# Patient Record
Sex: MALE | Race: BLACK OR AFRICAN AMERICAN | Employment: UNEMPLOYED | URBAN - METROPOLITAN AREA
[De-identification: names, ages, dates, MRNs, and addresses within clinical notes are randomized per-mention and may not be internally consistent; named-entity substitution may affect disease eponyms.]

---

## 2017-11-03 ENCOUNTER — HOSPITAL ENCOUNTER (INPATIENT)
Age: 27
LOS: 3 days | Discharge: HOME | DRG: 880 | End: 2017-11-07
Attending: EMERGENCY MEDICINE | Admitting: PSYCHIATRY & NEUROLOGY
Payer: SELF-PAY

## 2017-11-03 DIAGNOSIS — F41.8 DEPRESSION WITH ANXIETY: ICD-10-CM

## 2017-11-03 DIAGNOSIS — F95.9 TIC: ICD-10-CM

## 2017-11-03 DIAGNOSIS — F41.1 ANXIETY STATE: Primary | ICD-10-CM

## 2017-11-03 LAB
ALBUMIN SERPL-MCNC: 4.1 G/DL (ref 3.5–5)
ALBUMIN/GLOB SERPL: 1.1 {RATIO} (ref 1.1–2.2)
ALP SERPL-CCNC: 48 U/L (ref 45–117)
ALT SERPL-CCNC: 21 U/L (ref 12–78)
AMPHET UR QL SCN: NEGATIVE
ANION GAP SERPL CALC-SCNC: 9 MMOL/L (ref 5–15)
APAP SERPL-MCNC: <2 UG/ML (ref 10–30)
APPEARANCE UR: CLEAR
AST SERPL-CCNC: 14 U/L (ref 15–37)
BACTERIA URNS QL MICRO: NEGATIVE /HPF
BARBITURATES UR QL SCN: NEGATIVE
BASOPHILS # BLD: 0 K/UL (ref 0–0.1)
BASOPHILS NFR BLD: 0 % (ref 0–1)
BENZODIAZ UR QL: NEGATIVE
BILIRUB SERPL-MCNC: 0.6 MG/DL (ref 0.2–1)
BILIRUB UR QL: NEGATIVE
BUN SERPL-MCNC: 12 MG/DL (ref 6–20)
BUN/CREAT SERPL: 10 (ref 12–20)
CALCIUM SERPL-MCNC: 8.8 MG/DL (ref 8.5–10.1)
CANNABINOIDS UR QL SCN: NEGATIVE
CHLORIDE SERPL-SCNC: 106 MMOL/L (ref 97–108)
CO2 SERPL-SCNC: 25 MMOL/L (ref 21–32)
COCAINE UR QL SCN: NEGATIVE
COLOR UR: ABNORMAL
CREAT SERPL-MCNC: 1.23 MG/DL (ref 0.7–1.3)
DRUG SCRN COMMENT,DRGCM: NORMAL
EOSINOPHIL # BLD: 0.1 K/UL (ref 0–0.4)
EOSINOPHIL NFR BLD: 1 % (ref 0–7)
EPITH CASTS URNS QL MICRO: ABNORMAL /LPF
ERYTHROCYTE [DISTWIDTH] IN BLOOD BY AUTOMATED COUNT: 12.5 % (ref 11.5–14.5)
ETHANOL SERPL-MCNC: <10 MG/DL
GLOBULIN SER CALC-MCNC: 3.7 G/DL (ref 2–4)
GLUCOSE SERPL-MCNC: 124 MG/DL (ref 65–100)
GLUCOSE UR STRIP.AUTO-MCNC: NEGATIVE MG/DL
HCT VFR BLD AUTO: 47.4 % (ref 36.6–50.3)
HGB BLD-MCNC: 16.3 G/DL (ref 12.1–17)
HGB UR QL STRIP: NEGATIVE
KETONES UR QL STRIP.AUTO: ABNORMAL MG/DL
LEUKOCYTE ESTERASE UR QL STRIP.AUTO: NEGATIVE
LYMPHOCYTES # BLD: 1.2 K/UL (ref 0.8–3.5)
LYMPHOCYTES NFR BLD: 17 % (ref 12–49)
MCH RBC QN AUTO: 30.6 PG (ref 26–34)
MCHC RBC AUTO-ENTMCNC: 34.4 G/DL (ref 30–36.5)
MCV RBC AUTO: 89.1 FL (ref 80–99)
METHADONE UR QL: NEGATIVE
MONOCYTES # BLD: 0.3 K/UL (ref 0–1)
MONOCYTES NFR BLD: 4 % (ref 5–13)
MUCOUS THREADS URNS QL MICRO: ABNORMAL /LPF
NEUTS SEG # BLD: 5.7 K/UL (ref 1.8–8)
NEUTS SEG NFR BLD: 78 % (ref 32–75)
NITRITE UR QL STRIP.AUTO: NEGATIVE
OPIATES UR QL: NEGATIVE
PCP UR QL: NEGATIVE
PH UR STRIP: 8 [PH] (ref 5–8)
PLATELET # BLD AUTO: 207 K/UL (ref 150–400)
POTASSIUM SERPL-SCNC: 3.6 MMOL/L (ref 3.5–5.1)
PROT SERPL-MCNC: 7.8 G/DL (ref 6.4–8.2)
PROT UR STRIP-MCNC: ABNORMAL MG/DL
RBC # BLD AUTO: 5.32 M/UL (ref 4.1–5.7)
RBC #/AREA URNS HPF: ABNORMAL /HPF (ref 0–5)
SALICYLATES SERPL-MCNC: <1.7 MG/DL (ref 2.8–20)
SODIUM SERPL-SCNC: 140 MMOL/L (ref 136–145)
SP GR UR REFRACTOMETRY: 1.03 (ref 1–1.03)
UA: UC IF INDICATED,UAUC: ABNORMAL
UROBILINOGEN UR QL STRIP.AUTO: 1 EU/DL (ref 0.2–1)
WBC # BLD AUTO: 7.3 K/UL (ref 4.1–11.1)
WBC URNS QL MICRO: ABNORMAL /HPF (ref 0–4)

## 2017-11-03 PROCEDURE — 80053 COMPREHEN METABOLIC PANEL: CPT | Performed by: STUDENT IN AN ORGANIZED HEALTH CARE EDUCATION/TRAINING PROGRAM

## 2017-11-03 PROCEDURE — 85025 COMPLETE CBC W/AUTO DIFF WBC: CPT | Performed by: STUDENT IN AN ORGANIZED HEALTH CARE EDUCATION/TRAINING PROGRAM

## 2017-11-03 PROCEDURE — 90791 PSYCH DIAGNOSTIC EVALUATION: CPT

## 2017-11-03 PROCEDURE — 80307 DRUG TEST PRSMV CHEM ANLYZR: CPT | Performed by: STUDENT IN AN ORGANIZED HEALTH CARE EDUCATION/TRAINING PROGRAM

## 2017-11-03 PROCEDURE — 81001 URINALYSIS AUTO W/SCOPE: CPT | Performed by: STUDENT IN AN ORGANIZED HEALTH CARE EDUCATION/TRAINING PROGRAM

## 2017-11-03 PROCEDURE — 36415 COLL VENOUS BLD VENIPUNCTURE: CPT | Performed by: STUDENT IN AN ORGANIZED HEALTH CARE EDUCATION/TRAINING PROGRAM

## 2017-11-03 PROCEDURE — 99284 EMERGENCY DEPT VISIT MOD MDM: CPT

## 2017-11-03 PROCEDURE — 80307 DRUG TEST PRSMV CHEM ANLYZR: CPT | Performed by: EMERGENCY MEDICINE

## 2017-11-03 NOTE — ED TRIAGE NOTES
Pt arrives with 2 friends from a support group with head twitching onset approximately 1630 today with nausea. Also, reports seeing shadows and hearing voices. After being questioned, patient reports suicidal ideations without a plan and \"feeling tired\" Denies CP, SOB, dizziness, or V/D. Denies drug or alcohol use.

## 2017-11-03 NOTE — IP AVS SNAPSHOT
2700 [facility] [address] [place] [address] 
[phone] Patient: Amber Stevenson MRN: [id number] SNM:[id number] About your hospitalization You were admitted on:  2017 You last received care in the:  [address] You were discharged on:  2017 Why you were hospitalized Your primary diagnosis was:  Depression With Anxiety Things You Need To Do (next 8 weeks) Follow up with None Where:  None (395) Patient stated that they have no PCP Schedule an appointment with Lluvia Carrillo as soon as possible for a visit today Make an appointment with Tristin Pabon. Phone:  [phone] Where:  [address] [place],[address] [facility] [address] Discharge Orders None A check panfilo indicates which time of day the medication should be taken. My Medications TAKE these medications as instructed Instructions Each Dose to Equal  
 Morning Noon Evening Bedtime  
 sertraline 25 mg tablet Commonly known as:  ZOLOFT Start taking on:  2017 Your next dose is:  Tomorrow at 9am  
   
 Take 1 Tab by mouth daily. Indications: ANXIETY WITH DEPRESSION  
 25 mg Where to Get Your Medications Information on where to get these meds will be given to you by the nurse or doctor. ! Ask your nurse or doctor about these medications  
  sertraline 25 mg tablet Discharge Instructions DISCHARGE SUMMARY 
 
Anne Fajardo : 1990 MRN: [id number] The patient Amber Stevenson exhibits the ability to control behavior in a less restrictive environment. Patient's level of functioning is improving. No assaultive/destructive behavior has been observed for the past 24 hours. No suicidal/homicidal threat or behavior has been observed for the past 24 hours.   There is no evidence of serious medication side effects. Patient has not been in physical or protective restraints for at least the past 24 hours. If weapons involved, how are they secured? No weapons involved. Is patient aware of and in agreement with discharge plan? Yes Arrangements for medication:  Prescriptions given to patient. Copy of discharge instructions to  provider?:  [facility] (Attn: Sommer Fox) Arrangements for transportation home:  Family to . Keep all follow up appointments as scheduled, continue to take prescribed medications per physician instructions. Mental health crisis number:  667 or your local mental health crisis line number at [phone]. DISCHARGE SUMMARY from Nurse PATIENT INSTRUCTIONS: 
 
What to do at Home: 
Recommended activity: Activity as tolerated, If you experience any of the following symptoms thoughts of harming self, feeling overwhelmed with anxiety and sadness, please follow up with your assigned providers and local crisis number. *  Please give a list of your current medications to your Primary Care Provider. *  Please update this list whenever your medications are discontinued, doses are 
    changed, or new medications (including over-the-counter products) are added. *  Please carry medication information at all times in case of emergency situations. These are general instructions for a healthy lifestyle: No smoking/ No tobacco products/ Avoid exposure to second hand smoke Surgeon General's Warning:  Quitting smoking now greatly reduces serious risk to your health. Obesity, smoking, and sedentary lifestyle greatly increases your risk for illness A healthy diet, regular physical exercise & weight monitoring are important for maintaining a healthy lifestyle You may be retaining fluid if you have a history of heart failure or if you experience any of the following symptoms:  Weight gain of 3 pounds or more overnight or 5 pounds in a week, increased swelling in our hands or feet or shortness of breath while lying flat in bed. Please call your doctor as soon as you notice any of these symptoms; do not wait until your next office visit. Recognize signs and symptoms of STROKE: 
 
F-face looks uneven A-arms unable to move or move unevenly S-speech slurred or non-existent T-time-call 911 as soon as signs and symptoms begin-DO NOT go Back to bed or wait to see if you get better-TIME IS BRAIN. Warning Signs of HEART ATTACK Call 911 if you have these symptoms: 
? Chest discomfort. Most heart attacks involve discomfort in the center of the chest that lasts more than a few minutes, or that goes away and comes back. It can feel like uncomfortable pressure, squeezing, fullness, or pain. ? Discomfort in other areas of the upper body. Symptoms can include pain or discomfort in one or both arms, the back, neck, jaw, or stomach. ? Shortness of breath with or without chest discomfort. ? Other signs may include breaking out in a cold sweat, nausea, or lightheadedness. Don't wait more than five minutes to call [address]! Fast action can save your life. Calling 911 is almost always the fastest way to get lifesaving treatment. Emergency Medical Services staff can begin treatment when they arrive  up to an hour sooner than if someone gets to the hospital by car. The discharge information has been reviewed with the patient. The patient verbalized understanding. Discharge medications reviewed with the patient and appropriate educational materials and side effects teaching were provided. ___________________________________________________________________________________________________________________________________ Introducing [address]!    
 Chapincito Cabrera introduces [employer] patient portal. Now you can access parts of your medical record, email your doctor's office, and request medication refills online. 1. In your internet browser, go to [email or web address] 2. Click on the First Time User? Click Here link in the Sign In box. You will see the New Member Sign Up page. 3. Enter your [employer] Access Code exactly as it appears below. You will not need to use this code after youve completed the sign-up process. If you do not sign up before the expiration date, you must request a new code. · [employer] Access Code: [email or web address] Expires: 2/1/2018 11:32 PM 
 
4. Enter the last four digits of your Social Security Number (xxxx) and Date of Birth (mm/dd/yyyy) as indicated and click Submit. You will be taken to the next sign-up page. 5. Create a [employer] ID. This will be your [employer] login ID and cannot be changed, so think of one that is secure and easy to remember. 6. Create a [employer] password. You can change your password at any time. 7. Enter your Password Reset Question and Answer. This can be used at a later time if you forget your password. 8. Enter your e-mail address. You will receive e-mail notification when new information is available in [address] 9. Click Sign Up. You can now view and download portions of your medical record. 10. Click the Download Summary menu link to download a portable copy of your medical information. If you have questions, please visit the Frequently Asked Questions section of the [employer] website. Remember, [employer] is NOT to be used for urgent needs. For medical emergencies, dial 911. Now available from your iPhone and Android! Providers Seen During Your Hospitalization Provider Specialty Primary office phone Hallie Doyle MD Emergency Medicine [phone] Marcie Alvarez MD Psychiatry [phone] Your Primary Care Physician (PCP) Primary Care Physician Office Phone Office Fax  NONE ** None ** ** None **  
  
 You are allergic to the following Allergen Reactions Seafood Hives Recent Documentation Height Weight BMI Smoking Status 1.676 m 55.2 kg 19.64 kg/m2 Current Some Day Smoker Emergency Contacts Name Discharge Info Relation Home Work Mobile Shadi Ludwig DISCHARGE CAREGIVER [3] Other Relative [[phone] Patient Belongings The following personal items are in your possession at time of discharge: 
  Dental Appliances: None  Visual Aid: None      Home Medications: None   Jewelry: None  Clothing: Pants, Shirt, Socks, Undergarments    Other Valuables: Keys, Money (comment) (6 dollars in cash 103 in change) Please provide this summary of care documentation to your next provider. Signatures-by signing, you are acknowledging that this After Visit Summary has been reviewed with you and you have received a copy. Patient Signature:  ____________________________________________________________ Date:  ____________________________________________________________  
  
Roxi Best Provider Signature:  ____________________________________________________________ Date:  ____________________________________________________________

## 2017-11-03 NOTE — IP AVS SNAPSHOT
Summary of Care Report The Summary of Care report has been created to help improve care coordination. Users with access to [employer] or [address] (Web-based application) may access additional patient information including the Discharge Summary. If you are not currently a [address] user and need more information, please call the number listed below in the [place] [address] section and ask to be connected with Medical Records. Facility Information Name Address Phone Gwendolyn Torres [address] [place] [address] [id number] [phone] Patient Information Patient Name Sex  Clotilde Kolb ([id number]) Male 1990 Discharge Information Admitting Provider Service Area Unit Antonio Laughlin MD / Lisbeth Rivera 238 / [phone] Discharge Provider Discharge Date/Time Discharge Disposition Destination (none) 2017 Afternoon (Pending) AHR (none) Patient Language Language ENGLISH [13] Hospital Problems as of 2017  Never Reviewed Class Noted - Resolved Last Modified POA Active Problems * (Principal)Depression with anxiety  2017 - Present 2017 by Leticia Chavarria MD Yes Entered by Antonio Laughlin MD  
  
You are allergic to the following Allergen Reactions Seafood Hives Current Discharge Medication List  
  
START taking these medications Dose & Instructions Dispensing Information Comments  
 sertraline 25 mg tablet Commonly known as:  ZOLOFT Start taking on:  2017 Dose:  25 mg Take 1 Tab by mouth daily. Indications: ANXIETY WITH DEPRESSION Quantity:  15 Tab Refills:  1 Follow-up Information Follow up With Details Comments Contact Info  None   None (395) Patient stated that they have no PCP 
  
 [facility] Schedule an appointment as soon as possible for a visit Make an appointment with Mode Tena. [address] 
[phone] Discharge Instructions DISCHARGE SUMMARY 
 
Xavier White : 1990 MRN: [id number] The patient Ameya Blas exhibits the ability to control behavior in a less restrictive environment. Patient's level of functioning is improving. No assaultive/destructive behavior has been observed for the past 24 hours. No suicidal/homicidal threat or behavior has been observed for the past 24 hours. There is no evidence of serious medication side effects. Patient has not been in physical or protective restraints for at least the past 24 hours. If weapons involved, how are they secured? No weapons involved. Is patient aware of and in agreement with discharge plan? Yes Arrangements for medication:  Prescriptions given to patient. Copy of discharge instructions to  provider?:  [facility] (Attn: Mode Tena) Arrangements for transportation home:  Family to . Keep all follow up appointments as scheduled, continue to take prescribed medications per physician instructions. Mental health crisis number:  [phone] or your local mental health crisis line number at [phone]. DISCHARGE SUMMARY from Nurse PATIENT INSTRUCTIONS: 
 
What to do at Home: 
Recommended activity: Activity as tolerated, If you experience any of the following symptoms thoughts of harming self, feeling overwhelmed with anxiety and sadness, please follow up with your assigned providers and local crisis number. *  Please give a list of your current medications to your Primary Care Provider. *  Please update this list whenever your medications are discontinued, doses are 
    changed, or new medications (including over-the-counter products) are added.  
 
*  Please carry medication information at all times in case of emergency situations. These are general instructions for a healthy lifestyle: No smoking/ No tobacco products/ Avoid exposure to second hand smoke Surgeon General's Warning:  Quitting smoking now greatly reduces serious risk to your health. Obesity, smoking, and sedentary lifestyle greatly increases your risk for illness A healthy diet, regular physical exercise & weight monitoring are important for maintaining a healthy lifestyle You may be retaining fluid if you have a history of heart failure or if you experience any of the following symptoms:  Weight gain of 3 pounds or more overnight or 5 pounds in a week, increased swelling in our hands or feet or shortness of breath while lying flat in bed. Please call your doctor as soon as you notice any of these symptoms; do not wait until your next office visit. Recognize signs and symptoms of STROKE: 
 
F-face looks uneven A-arms unable to move or move unevenly S-speech slurred or non-existent T-time-call 911 as soon as signs and symptoms begin-DO NOT go Back to bed or wait to see if you get better-TIME IS BRAIN. Warning Signs of HEART ATTACK Call 911 if you have these symptoms: 
? Chest discomfort. Most heart attacks involve discomfort in the center of the chest that lasts more than a few minutes, or that goes away and comes back. It can feel like uncomfortable pressure, squeezing, fullness, or pain. ? Discomfort in other areas of the upper body. Symptoms can include pain or discomfort in one or both arms, the back, neck, jaw, or stomach. ? Shortness of breath with or without chest discomfort. ? Other signs may include breaking out in a cold sweat, nausea, or lightheadedness. Don't wait more than five minutes to call [address]! Fast action can save your life. Calling 911 is almost always the fastest way to get lifesaving treatment.  Emergency Medical Services staff can begin treatment when they arrive  up to an hour sooner than if someone gets to the hospital by car. The discharge information has been reviewed with the patient. The patient verbalized understanding. Discharge medications reviewed with the patient and appropriate educational materials and side effects teaching were provided. ___________________________________________________________________________________________________________________________________ Chart Review Routing History No Routing History on File

## 2017-11-03 NOTE — IP AVS SNAPSHOT
[address] 
[phone] Patient: Juana Beal MRN: [id number] AOY:[id number]/5/6351 My Medications TAKE these medications as instructed Instructions Each Dose to Equal  
 Morning Noon Evening Bedtime  
 sertraline 25 mg tablet Commonly known as:  ZOLOFT Start taking on:  11/8/2017 Your next dose is:  Tomorrow at 9am  
   
 Take 1 Tab by mouth daily. Indications: ANXIETY WITH DEPRESSION  
 25 mg Where to Get Your Medications Information on where to get these meds will be given to you by the nurse or doctor. ! Ask your nurse or doctor about these medications  
  sertraline 25 mg tablet

## 2017-11-04 PROBLEM — F41.8 DEPRESSION WITH ANXIETY: Status: ACTIVE | Noted: 2017-11-04

## 2017-11-04 PROCEDURE — 65220000003 HC RM SEMIPRIVATE PSYCH

## 2017-11-04 PROCEDURE — 74011250637 HC RX REV CODE- 250/637: Performed by: PSYCHIATRY & NEUROLOGY

## 2017-11-04 RX ORDER — SERTRALINE HYDROCHLORIDE 50 MG/1
25 TABLET, FILM COATED ORAL DAILY
Status: DISCONTINUED | OUTPATIENT
Start: 2017-11-04 | End: 2017-11-07 | Stop reason: HOSPADM

## 2017-11-04 RX ORDER — OLANZAPINE 5 MG/1
5 TABLET ORAL
Status: DISCONTINUED | OUTPATIENT
Start: 2017-11-04 | End: 2017-11-05

## 2017-11-04 RX ORDER — ACETAMINOPHEN 500 MG
2 TABLET ORAL
Status: DISCONTINUED | OUTPATIENT
Start: 2017-11-04 | End: 2017-11-07 | Stop reason: HOSPADM

## 2017-11-04 RX ORDER — ZOLPIDEM TARTRATE 10 MG/1
10 TABLET ORAL
Status: DISCONTINUED | OUTPATIENT
Start: 2017-11-04 | End: 2017-11-07 | Stop reason: HOSPADM

## 2017-11-04 RX ORDER — BENZTROPINE MESYLATE 2 MG/1
2 TABLET ORAL
Status: DISCONTINUED | OUTPATIENT
Start: 2017-11-04 | End: 2017-11-07 | Stop reason: HOSPADM

## 2017-11-04 RX ORDER — LORAZEPAM 1 MG/1
1 TABLET ORAL
Status: DISCONTINUED | OUTPATIENT
Start: 2017-11-04 | End: 2017-11-07 | Stop reason: HOSPADM

## 2017-11-04 RX ORDER — ADHESIVE BANDAGE
30 BANDAGE TOPICAL DAILY PRN
Status: DISCONTINUED | OUTPATIENT
Start: 2017-11-04 | End: 2017-11-07 | Stop reason: HOSPADM

## 2017-11-04 RX ORDER — IBUPROFEN 400 MG/1
400 TABLET ORAL
Status: DISCONTINUED | OUTPATIENT
Start: 2017-11-04 | End: 2017-11-07 | Stop reason: HOSPADM

## 2017-11-04 RX ORDER — BENZTROPINE MESYLATE 1 MG/ML
2 INJECTION INTRAMUSCULAR; INTRAVENOUS
Status: DISCONTINUED | OUTPATIENT
Start: 2017-11-04 | End: 2017-11-07 | Stop reason: HOSPADM

## 2017-11-04 RX ORDER — IBUPROFEN 200 MG
1 TABLET ORAL
Status: DISCONTINUED | OUTPATIENT
Start: 2017-11-04 | End: 2017-11-07 | Stop reason: HOSPADM

## 2017-11-04 RX ORDER — LORAZEPAM 2 MG/ML
2 INJECTION INTRAMUSCULAR
Status: DISCONTINUED | OUTPATIENT
Start: 2017-11-04 | End: 2017-11-07 | Stop reason: HOSPADM

## 2017-11-04 RX ORDER — ACETAMINOPHEN 325 MG/1
650 TABLET ORAL
Status: DISCONTINUED | OUTPATIENT
Start: 2017-11-04 | End: 2017-11-07 | Stop reason: HOSPADM

## 2017-11-04 RX ADMIN — NICOTINE POLACRILEX 2 MG: 2 GUM, CHEWING ORAL at 10:50

## 2017-11-04 RX ADMIN — SERTRALINE HYDROCHLORIDE 25 MG: 50 TABLET ORAL at 12:05

## 2017-11-04 RX ADMIN — OLANZAPINE 5 MG: 5 TABLET, FILM COATED ORAL at 02:09

## 2017-11-04 RX ADMIN — LORAZEPAM 1 MG: 1 TABLET ORAL at 02:09

## 2017-11-04 NOTE — ED NOTES
Bedside shift change report given to Emigdio bauer RN (oncoming nurse) by Anthony Vallejo RN (offgoing nurse). Report included the following information SBAR, ED Summary, MAR and Recent Results.

## 2017-11-04 NOTE — BH NOTES
TRANSFER - IN REPORT:    Verbal report received from Deonte on Hildred Balling  being received from [facility] ER for routine progression of care      Report consisted of patients Situation, Background, Assessment and   Recommendations(SBAR). Information from the following report(s) SBAR, Kardex, ED Summary, MAR, Recent Results and Med Rec Status was reviewed with the receiving nurse. Opportunity for questions and clarification was provided. Assessment completed upon patients arrival to unit and care assumed.

## 2017-11-04 NOTE — ROUTINE PROCESS
TRANSFER - OUT REPORT:    Verbal report given to Poppy Harp Rn(name) on [facility]  being transferred to (unit) for routine progression of care       Report consisted of patients Situation, Background, Assessment and   Recommendations(SBAR). Information from the following report(s) SBAR, Kardex, ED Summary and MAR was reviewed with the receiving nurse. Lines:       Opportunity for questions and clarification was provided.       Patient transported with:   Tech   officer

## 2017-11-04 NOTE — PROGRESS NOTES
Problem: Altered Thought Process (Adult/Pediatric)  Goal: *STG: Remains safe in hospital  Outcome: Progressing Towards Goal  1530: Greeted patient on unit in room resting quietly. Appeared in no acute distress. Will continue to monitor on Q 15 minute safety checks. 2015: Patient alert. Vitals stable, wnl.   Medication compliant. Attended group. Cooperative with staff and peers.

## 2017-11-04 NOTE — BH NOTES
Behavioral Health Admission Note:    Patient admitted under Dr. Mitchell Baires for Anxiety D/O    Admission status: Voluntary    Chief complaint: \"I've had a lot of stress and anxiety lately\" Pt presented to [facility] ER with c/o anxiety and newly developed tic that he states is related to the anxiety. Pt denies si/HI at this time and contracts for safety. Pt states he is experiencing AH/VH but not of the command type. Will monitor via 15 minute observations and support. PTA MED VERIFICATION    PATIENT'S PHARMACY IS [facility] ON [address]  THE PHARMACY WAS CLOSED HOWEVER PATIENT IS NOT CURRENTLY ON MEDICATIONS  THEREFORE PTA MEDS WERE VERIFIED AND RECORDED ON PTA MED LIST    Primary Nurse Carole Nichols RN and Kinjal Pino. performed a dual skin assessment on this patient : pt presents with cracked skin on bottom of left foot. CDI no drainage noted from area. Otherwise skin assessment was unremarkable. Jewelry on patient: two awareness support bracelets which pt elected to keep  Patient behavior: Anxious, cooperative  Safety: Q15 minute observations per protocol      Pressure Ulcer Documentation  (COMPLETE ONE LABEL PER PRESSURE ULCER)  For further information, please review corresponding Wound Care flowsheet. Thomas So has:    No pressure ulcer noted and pressure ulcer prevention initiated. Nara Craft.  OLIVER

## 2017-11-04 NOTE — BH NOTES
GROUP THERAPY PROGRESS NOTE    Fernando Glasgow is participating in D/C Planning Group    Group time: 1 hour    Personal goal for participation:  Creating Wellness ( Video )     Goal orientation: personal    Group therapy participation: active and minimal    Therapeutic interventions reviewed and discussed:     Impression of participation: Pt was new to the group but her made significant contributions with his comments as it related to the concept of Wellness

## 2017-11-04 NOTE — BSMART NOTE
Comprehensive Assessment Form Part 1      Section I - Disposition    Axis I -  Anxiety Disorder                Depressive Disorder NOS  Axis II - Deferred  Axis III -   Past Medical History Date Comments     Migraine [G43.909]    Asthma (h/x reported)    Axis IV - Unemployed, recently had court involvement was released  [place] V - 54      The Medical Doctor to Psychiatrist conference was not completed. The Medical Doctor is in agreement with Psychiatrist disposition because of (reason) patient meets criteria for admission and is a voluntary admission. The plan is admit. [facility] 1 Unit  The on-call Psychiatrist consulted was Dr. Brigette Boucher. The admitting Psychiatrist will be Dr. Brigette Boucher. The admitting Diagnosis is Anxiety Disorder/ Depressive Disorder NOS. The Payor source is [employer]. The name of the representative was . This was approved for  days. The authorization number is . Section II - Integrated Summary  Summary:  Patient is a 24year old AA male reporting to ED with 2 friends from a support group with head twitching onset approximately 1630 today with nausea. Also, reports seeing shadows and hearing voices. After being questioned, patient reports suicidal ideations without a plan and \"feeling tired\" Denies CP, SOB, dizziness, or V/D. Denies drug or alcohol use. At bedside, patient was observed twitching throughout the assessment, fidgeting with is phone, unfocused, inconsistent eye contact. Patient denied being suicidal at the time of assessment, denied homicidal thoughts. Patient reported having hallucinations as reported shadows, and hearing names being called. Patient reported he has been having thoughts of harming himself without a plan. Patient reported having increased anxiety, depressed and stressed. Patient identified stressors as socioeconomic stressors (unemployed) and also stated he really does not know.  Patient reported that he has had twitching today and it has increased. As reported by his uncle after going through court system for lengthy period and things were released due to court staying he would not be able to follow through with proceedings. Patient has been having consistent twitching since court. Patient has not been eating and poor sleeping habits. Patient reported maintaining his personal care but uncle reported there has been a decline. As reported by uncle the client has had increased difficulty focusing  And constantly drifting while doing things and during conversations. Patient has appointment for November 27, 2017 with [facility]. Patient hasnot been previously hospitalized and was on medications earlier this year from [facility] as reported medication not identified. The patienthas demonstrated mental capacity to provide informed consent. The information is given by the patient and relative(s). The Chief Complaint is anxiety, hallucinations, depression. The Precipitant Factors are mental health, unemployed. Previous Hospitalizations: no  The patient has not previously been in restraints. Current Psychiatrist and/or  is none. Lethality Assessment:    The potential for suicide noted by the following: ideation not reported during assessment . The potential for homicide is not noted. The patient has not been a perpetrator of sexual or physical abuse. There are not pending charges. The patient is not felt to be at risk for self harm or harm to others. The attending nurse was advised not noted. Section III - Psychosocial  The patient's overall mood and attitude is anxious, depressed, cooperative. Feelings of helplessness and hopelessness are not observed. Generalized anxiety is not observed. Panic is not observed. Phobias are not observed. Obsessive compulsive tendencies are not observed. Section IV - Mental Status Exam  The patient's appearance is unkempt.   The patient's behavior displays tremors and shows poor eye contact. The patient is oriented to time, place, person and situation. The patient's speech shows no evidence of impairment. The patient's mood is depressed and is anxious. The range of affect is constricted. The patient's thought content demonstrates no evidence of impairment. The thought process shows no evidence of impairment. The patient's perception demonstrated changes in the following:  hallucinations. The patient's memory shows no evidence of impairment. The patient's appetite shows no evidence of impairment. The patient's sleep has evidence of insomnia. The patient's insight shows no evidence of impairment. The patient's judgement shows no evidence of impairment. Section V - Substance Abuse  The patient is using substances. The patient is using tobacco by inhalation for 5-10 years with last use on yesterday. The patient has experienced the following withdrawal symptoms: N/A. Section VI - Living Arrangements  The patient is single. The patient lives grandmother. The patient has no children. The patient does plan to return home upon discharge. The patient does not have legal issues pending. The patient's source of income comes from family. Denominational and cultural practices have not been voiced at this time. The patient's greatest support comes from family and this person will be involved with the treatment. The patient has not been in an event described as horrible or outside the realm of ordinary life experience either currently or in the past.  The patient has not been a victim of sexual/physical abuse. Section VII - Other Areas of Clinical Concern  The highest grade achieved is 11th with the overall quality of school experience being described as unknown. The patient is currently unemployed and speaks [place] as a primary language. The patient has no communication impairments affecting communication.  The patient's preference for learning can be described as: can read and write adequately.   The patient's hearing is normal.  The patient's vision is normal.      Sade Garcia

## 2017-11-04 NOTE — ED NOTES
Patient sitting quietly in chair. Denies needs or concerns at this time. Family at bedside. Will continue to monitor.

## 2017-11-04 NOTE — INTERDISCIPLINARY ROUNDS
Behavioral Health Interdisciplinary Rounds     Patient Name: Ameya Blas  Age: 24 y.o. Room/Bed:  731/  Primary Diagnosis: <principal problem not specified>   Admission Status: Voluntary     Readmission within 30 days: no  Power of  in place: no  Patient requires a blocked bed: no          Reason for blocked bed: n/a    VTE Prophylaxis: Not indicated  Flu vaccine given : no -refused  Mobility needs/Fall risk: no    Nutritional Plan: no  Consults: no         Labs/Testing due today?: no    Sleep hours: 2:45       Participation in Care/Groups:  NEW ADMIT  Medication Compliant?: Yes  PRNS (last 24 hours): Antipsychotic (PO) and Antianxiety    Restraints (last 24 hours):  no  Substance Abuse:  no  CIWA (range last 24 hours):  COWS (range last 24 hours):  Alcohol screening (AUDIT) completed -  AUDIT Score: 0  If applicable, date SBIRT discussed in treatment team AND documented: n/a  Tobacco - patient is a smoker: yes   Date tobacco education completed by RN: 11/4/17  24 hour chart check complete: yes     Patient goal(s) for today: Meet with Tx team to discuss course of tx and goals  Treatment team focus/goals: Complete Psych Social Assessment  Progress note : Alert, cooperative,anxious and difficulty expressing his thoughts. LOS:  0  Expected LOS:    Financial concerns/prescription coverage:  None  Date of last family contact:      Family requesting physician contact today:    Discharge plan: Return home to live with his grandmother  Guns in the home:  No      Outpatient provider(s): [facility] on 11/27/2017     Participating treatment team members: Marcellarenu Wan, Dr Benji Pro, lAex Quesada RN and Vanesa JACKSON

## 2017-11-04 NOTE — BH NOTES
0209 PRN Medication Documentation    Specific patient behavior that led to need for PRN medication: c/o anxiety as ah/vh  Staff interventions attempted prior to PRN being given: relaxation techniques discussed however pt arrived to unit anxious.   PRN medication given: ativan 1mg po, Zyprexa 5 mg po  Patient response/effectiveness of PRN medication: continue to monitor

## 2017-11-04 NOTE — H&P
INITIAL PSYCHIATRIC EVALUATION            IDENTIFICATION:    Patient Name  Yamini Aldridge   Date of Birth 1990   [facility] [id number]   Medical Record Number  [id number]      Age  24 y.o. PCP None   Admit date:  11/3/2017    Room Number  731/01  @ [facility]   Date of Service  11/4/2017            HISTORY         REASON FOR HOSPITALIZATION:  CC: Pt admitted under a voluntary basis for severe depression with suicidal ideations and  psychosis proving to be an imminent danger to self and and an inability to care for self. HISTORY OF PRESENT ILLNESS:    The patient, Yamini Aldridge, is a 24 y.o. BLACK OR  male with a past psychiatric history significant for depression and anxiety , who presents at this time with complaints of (and/or evidence of) the following emotional symptoms: depression, suicidal thoughts/threats, agitation and anxiety. Additional symptomatology include anxiety, depression worse, feeling depressed, feeling suicidal and financial problems. The above symptoms have been present for years . These symptoms are of moderate  severity. These symptoms are constant in nature. The patient's condition has been precipitated by financial problems and psychosocial stressors (problems with family and legal problems  ). He stated he hears voices and hears people call his name and interrupt him focusing and distract him and saying negative things , he is not feeling well today and he is anxious      ALLERGIES:   Allergies   Allergen Reactions    Seafood Hives      MEDICATIONS PRIOR TO ADMISSION:   No prescriptions prior to admission. PAST MEDICAL HISTORY:   Past Medical History:   Diagnosis Date    Migraine    History reviewed. No pertinent surgical history.    SOCIAL HISTORY: single and lives with grandmother    Social History     Social History    Marital status: SINGLE     Spouse name: N/A    Number of children: N/A    Years of education: N/A     Occupational History    Not on file. Social History Main Topics    Smoking status: Current Some Day Smoker    Smokeless tobacco: Never Used    Alcohol use No    Drug use: No    Sexual activity: Not on file     Other Topics Concern    Not on file     Social History Narrative      FAMILY HISTORY: History reviewed. No pertinent family history. History reviewed. No pertinent family history. REVIEW OF SYSTEMS:   History obtained from the patient  Pertinent items are noted in the History of Present Illness. All other Systems reviewed and are considered negative. MENTAL STATUS EXAM & VITALS     MENTAL STATUS EXAM (MSE):    MSE FINDINGS ARE WITHIN NORMAL LIMITS (WNL) UNLESS OTHERWISE STATED BELOW. ( ALL OF THE BELOW CATEGORIES OF THE MSE HAVE BEEN REVIEWED (reviewed 11/4/2017) AND UPDATED AS DEEMED APPROPRIATE )  General Presentation age appropriate, guarded   Orientation oriented to time, place and person   Vital Signs  See below (reviewed 11/4/2017); Vital Signs (BP, Pulse, & Temp) are within normal limits if not listed below.    Gait and Station Stable/steady, no ataxia   Musculoskeletal System No extrapyramidal symptoms (EPS); no abnormal muscular movements or Tardive Dyskinesia (TD); muscle strength and tone are within normal limits   Language No aphasia or dysarthria   Speech:  normal pitch and normal volume   Thought Processes logical; normal rate of thoughts; poor abstract reasoning/computation   Thought Associations goal directed   Thought Content free of delusions   Suicidal Ideations no plan    Homicidal Ideations no plan    Mood:  anxious    Affect:  anxious   Memory recent  fair   Memory remote:  fair   Concentration/Attention:  fair   Fund of Knowledge average   Insight:  fair   Reliability poor   Judgment:  fair          VITALS:     Patient Vitals for the past 24 hrs:   Temp Pulse Resp BP SpO2   11/04/17 0800 98.2 °F (36.8 °C) 61 17 134/71 94 %   11/04/17 0154 97.8 °F (36.6 °C) 76 17 121/82 100 %   11/04/17 0129 99.1 °F (37.3 °C) 82 16 121/74 100 %   11/03/17 2320 98.6 °F (37 °C) 83 16 131/83 99 %   11/03/17 2115 98.8 °F (37.1 °C) 89 18 101/66 100 %   11/03/17 1845 98.8 °F (37.1 °C) (!) 114 22 133/78 100 %     Wt Readings from Last 3 Encounters:   11/03/17 57.2 kg (126 lb)   02/07/15 61.2 kg (135 lb)     Temp Readings from Last 3 Encounters:   11/04/17 98.2 °F (36.8 °C)   02/07/15 98.1 °F (36.7 °C)     BP Readings from Last 3 Encounters:   11/04/17 134/71   02/07/15 119/75     Pulse Readings from Last 3 Encounters:   11/04/17 61   02/07/15 72            DATA     LABORATORY DATA:  Labs Reviewed   CBC WITH AUTOMATED DIFF - Abnormal; Notable for the following:        Result Value    NEUTROPHILS 78 (*)     MONOCYTES 4 (*)     All other components within normal limits   METABOLIC PANEL, COMPREHENSIVE - Abnormal; Notable for the following:     Glucose 124 (*)     BUN/Creatinine ratio 10 (*)     AST (SGOT) 14 (*)     All other components within normal limits   URINALYSIS W/ REFLEX CULTURE - Abnormal; Notable for the following:     Protein TRACE (*)     Ketone TRACE (*)     Mucus 1+ (*)     All other components within normal limits   ACETAMINOPHEN - Abnormal; Notable for the following:     Acetaminophen level <2 (*)     All other components within normal limits   SALICYLATE - Abnormal; Notable for the following:     Salicylate level <4.0 (*)     All other components within normal limits   SAMPLES BEING HELD   DRUG SCREEN, URINE   ETHYL ALCOHOL     Admission on 11/03/2017   Component Date Value Ref Range Status    WBC 11/03/2017 7.3  4.1 - 11.1 K/uL Final    RBC 11/03/2017 5.32  4.10 - 5.70 M/uL Final    HGB 11/03/2017 16.3  12.1 - 17.0 g/dL Final    HCT 11/03/2017 47.4  36.6 - 50.3 % Final    MCV 11/03/2017 89.1  80.0 - 99.0 FL Final    MCH 11/03/2017 30.6  26.0 - 34.0 PG Final    MCHC 11/03/2017 34.4  30.0 - 36.5 g/dL Final    RDW 11/03/2017 12.5  11.5 - 14.5 % Final    PLATELET 90/18/2660 455  150 - 400 K/uL Final    NEUTROPHILS 11/03/2017 78* 32 - 75 % Final    LYMPHOCYTES 11/03/2017 17  12 - 49 % Final    MONOCYTES 11/03/2017 4* 5 - 13 % Final    EOSINOPHILS 11/03/2017 1  0 - 7 % Final    BASOPHILS 11/03/2017 0  0 - 1 % Final    ABS. NEUTROPHILS 11/03/2017 5.7  1.8 - 8.0 K/UL Final    ABS. LYMPHOCYTES 11/03/2017 1.2  0.8 - 3.5 K/UL Final    ABS. MONOCYTES 11/03/2017 0.3  0.0 - 1.0 K/UL Final    ABS. EOSINOPHILS 11/03/2017 0.1  0.0 - 0.4 K/UL Final    ABS. BASOPHILS 11/03/2017 0.0  0.0 - 0.1 K/UL Final    Sodium 11/03/2017 140  136 - 145 mmol/L Final    Potassium 11/03/2017 3.6  3.5 - 5.1 mmol/L Final    Chloride 11/03/2017 106  97 - 108 mmol/L Final    CO2 11/03/2017 25  21 - 32 mmol/L Final    Anion gap 11/03/2017 9  5 - 15 mmol/L Final    Glucose 11/03/2017 124* 65 - 100 mg/dL Final    BUN 11/03/2017 12  6 - 20 MG/DL Final    Creatinine 11/03/2017 1.23  0.70 - 1.30 MG/DL Final    BUN/Creatinine ratio 11/03/2017 10* 12 - 20   Final    GFR est AA 11/03/2017 >60  >60 ml/min/1.73m2 Final    GFR est non-AA 11/03/2017 >60  >60 ml/min/1.73m2 Final    Calcium 11/03/2017 8.8  8.5 - 10.1 MG/DL Final    Bilirubin, total 11/03/2017 0.6  0.2 - 1.0 MG/DL Final    ALT (SGPT) 11/03/2017 21  12 - 78 U/L Final    AST (SGOT) 11/03/2017 14* 15 - 37 U/L Final    Alk.  phosphatase 11/03/2017 48  45 - 117 U/L Final    Protein, total 11/03/2017 7.8  6.4 - 8.2 g/dL Final    Albumin 11/03/2017 4.1  3.5 - 5.0 g/dL Final    Globulin 11/03/2017 3.7  2.0 - 4.0 g/dL Final    A-G Ratio 11/03/2017 1.1  1.1 - 2.2   Final    Color 11/03/2017 YELLOW/STRAW    Final    Appearance 11/03/2017 CLEAR  CLEAR   Final    Specific gravity 11/03/2017 1.030  1.003 - 1.030   Final    pH (UA) 11/03/2017 8.0  5.0 - 8.0   Final    Protein 11/03/2017 TRACE* NEG mg/dL Final    Glucose 11/03/2017 NEGATIVE   NEG mg/dL Final    Ketone 11/03/2017 TRACE* NEG mg/dL Final    Bilirubin 11/03/2017 NEGATIVE   NEG   Final    Blood 11/03/2017 NEGATIVE NEG   Final    Urobilinogen 11/03/2017 1.0  0.2 - 1.0 EU/dL Final    Nitrites 11/03/2017 NEGATIVE   NEG   Final    Leukocyte Esterase 11/03/2017 NEGATIVE   NEG   Final    WBC 11/03/2017 0-4  0 - 4 /hpf Final    RBC 11/03/2017 0-5  0 - 5 /hpf Final    Epithelial cells 11/03/2017 FEW  FEW /lpf Final    Bacteria 11/03/2017 NEGATIVE   NEG /hpf Final    UA:UC IF INDICATED 11/03/2017 CULTURE NOT INDICATED BY UA RESULT  CNI   Final    Mucus 11/03/2017 1+* NEG /lpf Final    AMPHETAMINES 11/03/2017 NEGATIVE   NEG   Final    BARBITURATES 11/03/2017 NEGATIVE   NEG   Final    BENZODIAZEPINES 11/03/2017 NEGATIVE   NEG   Final    COCAINE 11/03/2017 NEGATIVE   NEG   Final    METHADONE 11/03/2017 NEGATIVE   NEG   Final    OPIATES 11/03/2017 NEGATIVE   NEG   Final    PCP(PHENCYCLIDINE) 11/03/2017 NEGATIVE   NEG   Final    THC (TH-CANNABINOL) 11/03/2017 NEGATIVE   NEG   Final    Drug screen comment 11/03/2017 (NOTE)   Final    ALCOHOL(ETHYL),SERUM 11/03/2017 <10  <10 MG/DL Final    Acetaminophen level 11/03/2017 <2* 10 - 30 ug/mL Final    Salicylate level 69/05/5961 <1.7* 2.8 - 20.0 MG/DL Final        RADIOLOGY REPORTS:    Results from Hospital Encounter encounter on 02/07/15   XR CHEST PA LAT   Narrative **Final Report**      ICD Codes / Adm. Diagnosis: 724368   / Chest Pain  migraine  Examination:  CR CHEST PA AND LATERAL  - [id number] - Feb 7 2015 12:21PM  Accession No:  [id number]  Reason:  Chest Pain      REPORT:  Exam:  2 view chest    Indication: Chest pain    PA and lateral views demonstrate normal heart size. There is no acute   process in the lung fields. The osseous structures are unremarkable. IMPRESSION: No acute process. Signing/Reading Doctor: Lynda Strange ([id number])    Approved: Lynda Strange ([id number])  Feb 7 2015 12:22PM                               No results found.            MEDICATIONS       ALL MEDICATIONS  Current Facility-Administered Medications   Medication Dose Route Frequency    ziprasidone (GEODON) 20 mg in sterile water (preservative free) 1 mL injection  20 mg IntraMUSCular BID PRN    OLANZapine (ZyPREXA) tablet 5 mg  5 mg Oral Q6H PRN    benztropine (COGENTIN) tablet 2 mg  2 mg Oral BID PRN    benztropine (COGENTIN) injection 2 mg  2 mg IntraMUSCular BID PRN    LORazepam (ATIVAN) injection 2 mg  2 mg IntraMUSCular Q4H PRN    LORazepam (ATIVAN) tablet 1 mg  1 mg Oral Q4H PRN    zolpidem (AMBIEN) tablet 10 mg  10 mg Oral QHS PRN    acetaminophen (TYLENOL) tablet 650 mg  650 mg Oral Q4H PRN    ibuprofen (MOTRIN) tablet 400 mg  400 mg Oral Q8H PRN    magnesium hydroxide (MILK OF MAGNESIA) 400 mg/5 mL oral suspension 30 mL  30 mL Oral DAILY PRN    nicotine (NICODERM CQ) 21 mg/24 hr patch 1 Patch  1 Patch TransDERmal DAILY PRN      SCHEDULED MEDICATIONS  Current Facility-Administered Medications   Medication Dose Route Frequency                ASSESSMENT & PLAN        The patient, Isabella Foote, is a 24 y.o.  male who presents at this time for treatment of the following diagnoses:  Patient Active Hospital Problem List:   Depression with anxiety (11/4/2017)    Assessment: sadness and anxiety     Plan: initiate Zoloft 25 mg po daily            I will continue to monitor blood levels (Depakote, Tegretol, lithium, clozapine---a drug with a narrow therapeutic index= NTI) and associated labs for drug therapy implemented that require intense monitoring for toxicity as deemed appropriate based on current medication side effects and pharmacodynamically determined drug 1/2 lives. A coordinated, multidisplinary treatment team (includes the nurse, unit pharmcist,  and writer) round was conducted for this initial evaluation with the patient present. The following regarding medications was addressed during rounds with patient:   the risks and benefits of the proposed medication. The patient was given the opportunity to ask questions.  Informed consent given to the use of the above medications. I will continue to adjust psychiatric and non-psychiatric medications (see above \"medication\" section and orders section for details) as deemed appropriate & based upon diagnoses and response to treatment. I have reviewed admission (and previous/old) labs and medical tests in the EHR and or transferring hospital documents. I will continue to order blood tests/labs and diagnostic tests as deemed appropriate and review results as they become available (see orders for details). I have reviewed old psychiatric and medical records available in the EHR. I Will order additional psychiatric records from other institutions to further elucidate the nature of patient's psychopathology and review once available. I will gather additional collateral information from friends, family and o/p treatment team to further elucidate the nature of patient's psychopathology and baselline level of psychiatric functioning.       ESTIMATED LENGTH OF STAY:    3       STRENGTHS:  Access to housing/residential stability, Interpersonal/supportive relationships (family, friends, peers) and Knowledge of medications                                        SIGNED:    Freida Coronado MD  11/4/2017

## 2017-11-04 NOTE — ED NOTES
Stretcher moved into room. Provided patient with a blanket and pillow for comfort. Lights dimmed. VSS. Respirations equal and unlabored. Patient continues with head twitching. Family remains at bedside. Denies any needs or concerns at this time.

## 2017-11-04 NOTE — PROGRESS NOTES
GROUP THERAPY PROGRESS NOTE    Muñoz Neville is participating in [place]. Group time: 30 minutes    Personal goal for participation: n/a    Goal orientation: community    Group therapy participation: minimal    Therapeutic interventions reviewed and discussed:     Impression of participation: patient goals and unit expectations.

## 2017-11-04 NOTE — PROGRESS NOTES
Problem: Anxiety   11/10/17  Goal: *Alleviation of anxiety  Outcome: Progressing Towards Goal  Discussing anxiety. Coping skills encouraged. Problem: Altered Thought Process (Adult/Pediatric)  Goal: *STG: Remains safe in hospital  Outcome: Progressing Towards Goal  Pt remains safe in hospital on q 15 min safety checks. Denies SI/ HI. Goal: *STG: Attends activities and groups  Outcome: Progressing Towards Goal  Attending groups. Reports AH: voices.      [facility] 60  Master Treatment Plan for Kennedi Singh    Date Treatment Plan Initiated: 11/4/17    Treatment Plan Modalities:  Type of Modality Amount  (x minutes) Frequency (x/week) Duration (x days) Name of Responsible Staff   [facility] meetings to encourage peer interactions 15 7 1   Nydia RIOS   Group psychotherapy to assist in building coping skills and internal controls 60 7 1 Jean Jacome LCSW    Therapeutic activity groups to build coping skills 60 7 1 Jean Jacome LCSW    Psychoeducation in group setting to address:   Medication education   15 7 221 [facility]   Coping skills         Relaxation techniques         Symptom management         Discharge planning         Spirituality    60 [facility]   60 1 1 Volunteer From [facility]   Recovery/AA/NA   61 3 1 Volunteer from [address] [place] [address] medication management   15 7 1 Dr Kellen Grace meeting/discharge planning                                        15

## 2017-11-04 NOTE — ED PROVIDER NOTES
HPI Comments: 24 y.o. male with past medical history significant for migraine who presents from home with chief complaint of a mental health problem. Pt reports anxiety and depression \"for years\", he has not been eating or sleeping well for 4-5 days, and his head began twitching today. Per Pt's uncle, Pt was formerly on medication for depression through [facility], but he stopped taking it several years ago. There are no other acute medical concerns at this time. Note written by Darryl Cota, as dictated by Tayla Mosley MD 8:32 PM    The history is provided by the patient. Past Medical History:   Diagnosis Date    Migraine        History reviewed. No pertinent surgical history. History reviewed. No pertinent family history. Social History     Social History    Marital status: SINGLE     Spouse name: N/A    Number of children: N/A    Years of education: N/A     Occupational History    Not on file. Social History Main Topics    Smoking status: Current Some Day Smoker    Smokeless tobacco: Never Used    Alcohol use No    Drug use: No    Sexual activity: Not on file     Other Topics Concern    Not on file     Social History Narrative         ALLERGIES: Seafood    Review of Systems   Constitutional: Negative for diaphoresis and fever. HENT: Negative for facial swelling. Eyes: Negative for visual disturbance. Respiratory: Negative for cough. Cardiovascular: Negative for chest pain. Gastrointestinal: Negative for abdominal pain. Genitourinary: Negative for dysuria. Musculoskeletal: Negative for joint swelling. Skin: Negative for rash. Neurological: Positive for tremors. Negative for headaches. Hematological: Negative for adenopathy. Psychiatric/Behavioral: Positive for sleep disturbance. The patient is nervous/anxious. All other systems reviewed and are negative.       Vitals:    11/03/17 1845 11/03/17 2115   BP: 133/78 101/66   Pulse: (!) 114 89   Resp: 22 18   Temp: 98.8 °F (37.1 °C) 98.8 °F (37.1 °C)   SpO2: 100% 100%   Weight: 57.2 kg (126 lb)    Height: 5' 6\" (1.676 m)             Physical Exam   Constitutional: He is oriented to person, place, and time. He appears well-developed and well-nourished. No distress. Bobbing head movements. Only present when Pt is speaking. HENT:   Head: Normocephalic and atraumatic. Mouth/Throat: Oropharynx is clear and moist.   Eyes: Pupils are equal, round, and reactive to light. Neck: Normal range of motion. Neck supple. Cardiovascular: Normal rate, regular rhythm, normal heart sounds and intact distal pulses. Pulmonary/Chest: Effort normal and breath sounds normal. No respiratory distress. Abdominal: Soft. Bowel sounds are normal. He exhibits no distension. There is no tenderness. Musculoskeletal: Normal range of motion. He exhibits no edema. Neurological: He is alert and oriented to person, place, and time. Skin: Skin is warm and dry. Nursing note and vitals reviewed. Note written by Darryl Oates, as dictated by David De Jesus MD 8:32 PM    MDM  Number of Diagnoses or Management Options  Diagnosis management comments: A:  24yo M with pmh sig for anxiety/depression who developed involuntary head movements today. Head bobbing stops when patient is resting and resumes when questioning patient. VS normal.  Denies any recent drugs, etoh. No new medications. Pt did not express SI to me but did report this to nurse. P:  Medical clearance labs  [facility] consult    ED Course       Procedures    9:52 PM  Labs and urine unremarkable. No evidence of underlying medical pathology. Medically cleared for [facility] admission. Pt signed out to Dr. Praneeth Gu pending [facility] and consult and final disposition.

## 2017-11-04 NOTE — BH NOTES
voiced    Education/work history: 11th Grade, work history unk and     Have you been licensed as a denys care professional (current or ):  No    Leisure and recreation preferences:     Describe coping skills:  Limited, poor judgement and ineffective    Trinity Ballard  2017

## 2017-11-04 NOTE — ED NOTES
Provided patient with crackers and a soda per request. Denies any other needs or concerns at this time. Family remains at bedside.

## 2017-11-05 PROCEDURE — 74011250637 HC RX REV CODE- 250/637: Performed by: PSYCHIATRY & NEUROLOGY

## 2017-11-05 PROCEDURE — 65220000003 HC RM SEMIPRIVATE PSYCH

## 2017-11-05 PROCEDURE — 74011250636 HC RX REV CODE- 250/636: Performed by: PSYCHIATRY & NEUROLOGY

## 2017-11-05 RX ADMIN — BENZTROPINE MESYLATE 2 MG: 1 INJECTION INTRAMUSCULAR; INTRAVENOUS at 07:31

## 2017-11-05 RX ADMIN — SERTRALINE HYDROCHLORIDE 25 MG: 50 TABLET ORAL at 08:45

## 2017-11-05 NOTE — PROGRESS NOTES
Problem: Falls - Risk of  Goal: *Absence of Falls  Document Drake Fall Risk and appropriate interventions in the flowsheet.    Outcome: Progressing Towards Goal  Fall Risk Interventions:            Medication Interventions: Teach patient to arise slowly

## 2017-11-05 NOTE — BH NOTES
PSYCHIATRIC PROGRESS NOTE         Patient Name  Amber Stevenson   Date of Birth 1990   [facility] [id number]   Medical Record Number  [id number]      Age  24 y.o. PCP None   Admit date:  11/3/2017    Room Number  731/01  @ [facility]   Date of Service  11/5/2017          PSYCHOTHERAPY SESSION NOTE:  Length of psychotherapy session: 15 minutes    Main condition/diagnosis/issues treated during session today, 11/5/2017 : anxiety and depression     I employed Cognitive Behavioral therapy techniques, Reality-Oriented psychotherapy, as well as supportive psychotherapy in regards to various ongoing psychosocial stressors, including the following: pre-admission and current problems; housing issues; occupational issues; academic issues; legal issues; medical issues; and stress of hospitalization. Interpersonal relationship issues and psychodynamic conflicts explored. Attempts made to alleviate maladaptive patterns. We, also, worked on issues of denial & effects of substance dependency/use     Overall, patient is not progressing    Treatment Plan Update (reviewed an updated 11/5/2017) : I will modify psychotherapy tx plan by implementing more stress management strategies, building upon cognitive behavioral techniques, increasing coping skills, as well as shoring up psychological defenses). An extended energy and skill set was needed to engage pt in psychotherapy due to some of the following: resistiveness, complexity, negativity, confrontational nature, hostile behaviors, and/or severe abnormalities in thought processes/psychosis resulting in the loss of expressive/receptive language communication skills. E & M PROGRESS NOTE:         HISTORY       CC: Pt admitted under a voluntary basis for severe depression with suicidal ideations and  psychosis proving to be an imminent danger to self and and an inability to care for self.     HISTORY OF PRESENT ILLNESS:    The patient, Amber Stevenson, is a 24 y.o. BLACK OR  male with a past psychiatric history significant for depression and anxiety , who presents at this time with complaints of (and/or evidence of) the following emotional symptoms: depression, suicidal thoughts/threats, agitation and anxiety. Additional symptomatology include anxiety, depression worse, feeling depressed, feeling suicidal and financial problems. The above symptoms have been present for years . These symptoms are of moderate  severity. These symptoms are constant in nature. The patient's condition has been precipitated by financial problems and psychosocial stressors (problems with family and legal problems  ). He stated he hears voices and hears people call his name and interrupt him focusing and distract him and saying negative things , he is not feeling well today and he is anxious   11/-05/17 he stated he is feeling anxious and he gets shaky and has involuntary movement in his neck and had that before while he is home and he got panic attack and he received medication and he has no aggressive behavior and no self harm behavior  And he talked to his family          SIDE EFFECTS: (reviewed/updated 11/5/2017)  None reported or admitted to. ALLERGIES:(reviewed/updated 11/5/2017)  Allergies   Allergen Reactions    Seafood Hives      MEDICATIONS PRIOR TO ADMISSION:(reviewed/updated 11/5/2017)  No prescriptions prior to admission. PAST MEDICAL HISTORY: Past medical history from the initial psychiatric evaluation has been reviewed (reviewed/updated 11/5/2017) with no additional updates (I asked patient and no additional past medical history provided). Past Medical History:   Diagnosis Date    Migraine    History reviewed. No pertinent surgical history.    SOCIAL HISTORY: Social history from the initial psychiatric evaluation has been reviewed (reviewed/updated 11/5/2017) with no additional updates (I asked patient and no additional social history provided). Social History     Social History    Marital status: SINGLE     Spouse name: N/A    Number of children: N/A    Years of education: N/A     Occupational History    Not on file. Social History Main Topics    Smoking status: Current Some Day Smoker    Smokeless tobacco: Never Used    Alcohol use No    Drug use: No    Sexual activity: Not on file     Other Topics Concern    Not on file     Social History Narrative      FAMILY HISTORY: Family history from the initial psychiatric evaluation has been reviewed (reviewed/updated 11/5/2017) with no additional updates (I asked patient and no additional family history provided). History reviewed. No pertinent family history. REVIEW OF SYSTEMS: (reviewed/updated 11/5/2017)  Appetite:improved   Sleep: improved   All other Review of Systems: History obtained from the patient         [facility] (MSE):    MSE FINDINGS ARE WITHIN NORMAL LIMITS (WNL) UNLESS OTHERWISE STATED BELOW. ( ALL OF THE BELOW CATEGORIES OF THE MSE HAVE BEEN REVIEWED (reviewed 11/5/2017) AND UPDATED AS DEEMED APPROPRIATE )  General Presentation age appropriate, cooperative   Orientation oriented to time, place and person   Vital Signs  See below (reviewed 11/5/2017); Vital Signs (BP, Pulse, & Temp) are within normal limits if not listed below.    Gait and Station Stable/steady, no ataxia   Musculoskeletal System No extrapyramidal symptoms (EPS); no abnormal muscular movements or Tardive Dyskinesia (TD); muscle strength and tone are within normal limits   Language No aphasia or dysarthria   Speech:  normal pitch and normal volume   Thought Processes logical; slow rate of thoughts; poor abstract reasoning/computation   Thought Associations goal directed   Thought Content free of delusions   Suicidal Ideations no plan  and no intention   Homicidal Ideations no plan  and no intention   Mood:  anxious    Affect:  anxious   Memory recent  impaired Memory remote:  fair     Concentration/Attention:  fair   Fund of Knowledge average   Insight:  limited   Reliability poor   Judgment:  fair          VITALS:     Patient Vitals for the past 24 hrs:   Temp Pulse Resp BP SpO2   11/05/17 0658 97.7 °F (36.5 °C) 77 16 115/81 98 %   11/04/17 2001 97.6 °F (36.4 °C) (!) 58 16 103/65 100 %   11/04/17 1534 97.4 °F (36.3 °C) (!) 57 18 122/75 96 %   11/04/17 1202 98.4 °F (36.9 °C) 78 16 126/84 98 %     Wt Readings from Last 3 Encounters:   11/03/17 57.2 kg (126 lb)   02/07/15 61.2 kg (135 lb)     Temp Readings from Last 3 Encounters:   11/05/17 97.7 °F (36.5 °C)   02/07/15 98.1 °F (36.7 °C)     BP Readings from Last 3 Encounters:   11/05/17 115/81   02/07/15 119/75     Pulse Readings from Last 3 Encounters:   11/05/17 77   02/07/15 72            DATA     LABORATORY DATA:(reviewed/updated 11/5/2017)  No results found for this or any previous visit (from the past 24 hour(s)). No results found for: VALF2, VALAC, VALP, VALPR, DS6, CRBAM, CRBAMP, CARB2, XCRBAM  No results found for: LITHM   RADIOLOGY REPORTS:(reviewed/updated 11/5/2017)  No results found.        MEDICATIONS     ALL MEDICATIONS:   Current Facility-Administered Medications   Medication Dose Route Frequency    ziprasidone (GEODON) 20 mg in sterile water (preservative free) 1 mL injection  20 mg IntraMUSCular BID PRN    benztropine (COGENTIN) tablet 2 mg  2 mg Oral BID PRN    benztropine (COGENTIN) injection 2 mg  2 mg IntraMUSCular BID PRN    LORazepam (ATIVAN) injection 2 mg  2 mg IntraMUSCular Q4H PRN    LORazepam (ATIVAN) tablet 1 mg  1 mg Oral Q4H PRN    zolpidem (AMBIEN) tablet 10 mg  10 mg Oral QHS PRN    acetaminophen (TYLENOL) tablet 650 mg  650 mg Oral Q4H PRN    ibuprofen (MOTRIN) tablet 400 mg  400 mg Oral Q8H PRN    magnesium hydroxide (MILK OF MAGNESIA) 400 mg/5 mL oral suspension 30 mL  30 mL Oral DAILY PRN    nicotine (NICODERM CQ) 21 mg/24 hr patch 1 Patch  1 Patch TransDERmal DAILY PRN    sertraline (ZOLOFT) tablet 25 mg  25 mg Oral DAILY    nicotine (NICORETTE) gum 2 mg  2 mg Oral Q2H PRN      SCHEDULED MEDICATIONS:   Current Facility-Administered Medications   Medication Dose Route Frequency    sertraline (ZOLOFT) tablet 25 mg  25 mg Oral DAILY          ASSESSMENT & PLAN     DIAGNOSES REQUIRING ACTIVE TREATMENT AND MONITORING: (reviewed/updated 11/5/2017)  Patient Active Hospital Problem List:   Depression with anxiety (11/4/2017)    Assessment: Anxiety , Tics , R/O dystonia     Plan: Continue Zoloft  And D/C Zyprexa             I will continue to monitor blood levels (Depakote, Tegretol, lithium, clozapine---a drug with a narrow therapeutic index= NTI) and associated labs for drug therapy implemented that require intense monitoring for toxicity as deemed appropriate based on current medication side effects and pharmacodynamically determined drug 1/2 lives. In summary, Errol Landon, is a 24 y.o.  male who presents with a severe exacerbation of the principal diagnosis of <principal problem not specified>  Patient's condition is worsening/not improving/not stable improving. Patient requires continued inpatient hospitalization for further stabilization, safety monitoring and medication management. I will continue to coordinate the provision of individual, milieu, occupational, group, and substance abuse therapies to address target symptoms/diagnoses as deemed appropriate for the individual patient. A coordinated, multidisplinary treatment team round was conducted with the patient (this team consists of the nurse, psychiatric unit pharmcist,  and writer). Complete current electronic health record for patient has been reviewed today including consultant notes, ancillary staff notes, nurses and psychiatric tech notes. Suicide risk assessment completed and patient deemed to be of low risk for suicide at this time.      The following regarding medications was addressed during rounds with patient:   the risks and benefits of the proposed medication. The patient was given the opportunity to ask questions. Informed consent given to the use of the above medications. Will continue to adjust psychiatric and non-psychiatric medications (see above \"medication\" section and orders section for details) as deemed appropriate & based upon diagnoses and response to treatment. I will continue to order blood tests/labs and diagnostic tests as deemed appropriate and review results as they become available (see orders for details and above listed lab/test results). I will order psychiatric records from previous [facility] to further elucidate the nature of patient's psychopathology and review once available. I will gather additional collateral information from friends, family and o/p treatment team to further elucidate the nature of patient's psychopathology and baselline level of psychiatric functioning. I certify that this patient's inpatient psychiatric hospital services furnished since the previous certification were, and continue to be, required for treatment that could reasonably be expected to improve the patient's condition, or for diagnostic study, and that the patient continues to need, on a daily basis, active treatment furnished directly by or requiring the supervision of inpatient psychiatric facility personnel. In addition, the hospital records show that services furnished were intensive treatment services, admission or related services, or equivalent services.     EXPECTED DISCHARGE DATE/DAY: TBD     DISPOSITION: Home       Signed By:   Freida Coronado MD  11/5/2017

## 2017-11-05 NOTE — PROGRESS NOTES
GROUP THERAPY PROGRESS NOTE    Jose Colorado is participating in Goals Group.      Group time: 20 minutes    Personal goal for participation: Pt goal is to make a phone call to family to discuss future plans    Goal orientation: personal    Group therapy participation: active    Therapeutic interventions reviewed and discussed: discussed unit schedule and discussed goal setting    Impression of participation: actively listened, asked questions, engaged in conversation

## 2017-11-05 NOTE — PROGRESS NOTES
Problem: Altered Thought Process (Adult/Pediatric)  Goal: *STG: Participates in treatment plan  Outcome: Progressing Towards Goal  Pt participated in treatment team. Visible on the unit. Little interaction with peers. Stated \"I think I am panicking\". Pacing the hallway. Goal: *STG: Remains safe in hospital  Outcome: Progressing Towards Goal  Pt remains safe in the hospital. Denies SI/HI at this time. Continued on Q 15 minute safety checks. Goal: *STG: Seeks staff when feelings of anxiety and fear arise  Outcome: Progressing Towards Goal  Able to communicate feeling anxious during treatment team.   Goal: *STG: Complies with medication therapy  Outcome: Progressing Towards Goal  Took medications as scheduled during the shift.

## 2017-11-05 NOTE — BH NOTES
PRN Medication Documentation    Specific patient behavior that led to need for PRN medication: acute dystonic reaction (head, shoulders, tongue invol. movements)  Staff interventions attempted prior to PRN being given:   PRN medication given: 2 mg cogentin IM  Patient response/effectiveness of PRN medication: 0807:  Patient's involuntary movements are greatly lessened.

## 2017-11-05 NOTE — PROGRESS NOTES
Problem: Altered Thought Process (Adult/Pediatric)  Goal: *STG: Remains safe in hospital  Outcome: Progressing Towards Goal  1530: Greeted patient on unit in room resting quietly. Appeared in on acute distress. Will continue to monitor on Q 15 minute safety checks. Alert. Vitals stable, wnl. Compliant. Visible on unit for dinner. Appetite good.

## 2017-11-06 PROCEDURE — 74011250637 HC RX REV CODE- 250/637: Performed by: PSYCHIATRY & NEUROLOGY

## 2017-11-06 PROCEDURE — 65220000003 HC RM SEMIPRIVATE PSYCH

## 2017-11-06 RX ADMIN — SERTRALINE HYDROCHLORIDE 25 MG: 50 TABLET ORAL at 08:43

## 2017-11-06 RX ADMIN — LORAZEPAM 1 MG: 1 TABLET ORAL at 10:49

## 2017-11-06 NOTE — INTERDISCIPLINARY ROUNDS
Behavioral Health Interdisciplinary Rounds     Patient Name: Oseas Manley  Age: 24 y.o. Room/Bed:  731/  Primary Diagnosis: <principal problem not specified>   Admission Status: Voluntary     Readmission within 30 days: no  Power of  in place: no  Patient requires a blocked bed: no       Reason for blocked bed:     VTE Prophylaxis:   Flu vaccine given :   Mobility needs/Fall risk: no    Nutritional Plan:   Consults:        Labs/Testing due today?:yes , pt refused TSH     Sleep hours:  8 hours       Participation in Care/Group no  Medication Compliant?:yes    PRNS (last 24 hours): cogentin     Restraints (last 24 hours): no  Substance Abuse:    CIWA (range last 24 hours):  COWS (range last 24 hours):   Alcohol screening (AUDIT) completed -  AUDIT Score: 0  If applicable, date SBIRT discussed in treatment team AND documented: N/A  Tobacco - patient is a smoke   Yes    Date tobacco education completed by RN: 11/6/17  24 hour chart check complete: yes     Patient goal(s) for today: Don't loiter at unit door  Treatment team focus/goals: Call family  Progress note: Patient presents with bizarre behavior; involuntary movements. Pt reports history of trespassing and being fired from jobs for [employer] at people a certain way. \"  Pt reports AH.     LOS:  2  Expected LOS: TBD    Financial concerns/prescription coverage: Uninsured  Date of last family contact: 11/6 SW spoke to mom  Family requesting physician contact: No  Discharge plan: Return home to grandmother when stable for discharge  Guns in the home: Unknown       Outpatient provider(s): Divya Cerda    Participating treatment team members: Oseas Manley, MARI Palacio; Dr. Shanda Adams MD; Peg Brunner, OLIVER

## 2017-11-06 NOTE — BH NOTES
GROUP THERAPY PROGRESS NOTE    Kayla Serrano is participating in Process Group. Group time: 50 minutes    Personal goal for participation: To gain awareness    Goal orientation: personal    Group therapy participation: active    Therapeutic interventions reviewed and discussed: How Do You Handle Anger Handout    Impression of participation: Patient spoke about his sister who was the angry one in his family. He does not believe he has any anger issues and states he handles his anger appropriately. He checked every statement in all four columns of the self-evaluation but was pleasant and appropriate during the discussion.

## 2017-11-06 NOTE — BH NOTES
GROUP THERAPY PROGRESS NOTE    Susan Cruz did not participate in the General Unit's 90 minute Process Group, with a focus identifying feelings and planning for the day.

## 2017-11-06 NOTE — BH NOTES
Patient particiapated in group. Group discussion was on unit expectations and setting personal goals. Patient appears to be pleasant but responding to internal stimuli. Patient expressed his goal for today was talk with his doctor about discharge soon.

## 2017-11-06 NOTE — BH NOTES
GROUP THERAPY PROGRESS NOTE    Jefe Lucero is participating in Positive thoughts. Group time: 15 minutes    Personal goal for participation: n/a    Goal orientation: relaxation    Group therapy participation: did not attend    Therapeutic interventions reviewed and discussed: n/a    Impression of participation: Patient did not attend group.

## 2017-11-06 NOTE — PROGRESS NOTES
Problem: Altered Thought Process (Adult/Pediatric)  Goal: *STG: Participates in treatment plan  Outcome: Progressing Towards Goal  Pt participated in treatment team. Babara Hamman to feel anxious at this time. Pacing the hallway during the shift. Goal: *STG: Remains safe in hospital  Outcome: Progressing Towards Goal  Remains safe on the unit. Denies SI/HI at this time. Continued on Q 15 minute safety checks. Goal: *STG: Complies with medication therapy  Outcome: Progressing Towards Goal  Taking medications as scheduled. 11:49 am-Dr. Rice  notified pt refused TSH in am.     12:33 pm-Dr. Nayak Certain will see pt.

## 2017-11-06 NOTE — PROGRESS NOTES
NUTRITION  Pt seen for:     [x]           MST (unsure wt loss)       RECOMMENDATIONS:   1. Daily weights on standing scale    SUBJECTIVE/OBJECTIVE:   Information obtained from:   patient        Pt admitted for depression. Medications started. Pt reports hopefull d/c home tomorrow. Pt visited today and reports wt has been stable PTA with fair appetite. Appetite good at dinner yesterday per RN reports. Pt agreeable to HS snack and dinner order taken. No complaints or concerns at this time. Diet:  regular  Intake: [x]           Good      Weight Changes:   []            Stable - pt reports #. Wt fluctuating significantly x 2 days. Question accuracy. Please continue daily weights to monitor. Last 3 Recorded Weights in this Encounter    11/03/17 1845 11/05/17 1242   Weight: 57.2 kg (126 lb) 55.2 kg (121 lb 11.2 oz)     Nutrition Problems Identified:  [x]      None    PLAN:   [x]           Obtained/adjusted food preferences/tolerances and/or snacks options   [x]           Add Snack    Rescreen:  []            At Nutrition Risk           [x]            Not at Nutrition Risk, please consult if needed during remainder of stay.      Vu Armijo RD

## 2017-11-06 NOTE — BH NOTES
PRN Medication Documentation  At 2470   Specific patient behavior that led to need for PRN medication: c/o anxiety   Staff interventions attempted prior to PRN being given: relaxation  PRN medication given: ativan 1 mg po prn   Patient response/effectiveness of PRN medication at 1130 med effective per pt \"helpful\"

## 2017-11-06 NOTE — PROGRESS NOTES
Problem: Falls - Risk of  Goal: *Absence of Falls  Document Drake Fall Risk and appropriate interventions in the flowsheet.    Outcome: Progressing Towards Goal  Fall Risk Interventions:  Lying quietly in bed with eyes closed, respirations even and unlabored , NAd noted   Q15 min safety rounds continue , Night light on for safety           Medication Interventions: Teach patient to arise slowly

## 2017-11-07 VITALS
HEIGHT: 66 IN | TEMPERATURE: 98.3 F | DIASTOLIC BLOOD PRESSURE: 85 MMHG | HEART RATE: 64 BPM | OXYGEN SATURATION: 100 % | WEIGHT: 121.7 LBS | RESPIRATION RATE: 16 BRPM | BODY MASS INDEX: 19.56 KG/M2 | SYSTOLIC BLOOD PRESSURE: 126 MMHG

## 2017-11-07 PROCEDURE — 74011250637 HC RX REV CODE- 250/637: Performed by: PSYCHIATRY & NEUROLOGY

## 2017-11-07 RX ORDER — SERTRALINE HYDROCHLORIDE 25 MG/1
25 TABLET, FILM COATED ORAL DAILY
Qty: 15 TAB | Refills: 1 | Status: SHIPPED | OUTPATIENT
Start: 2017-11-08

## 2017-11-07 RX ADMIN — SERTRALINE HYDROCHLORIDE 25 MG: 50 TABLET ORAL at 09:06

## 2017-11-07 NOTE — PROGRESS NOTES
Problem: Anxiety  Goal: *Alleviation of anxiety  Outcome: Progressing Towards Goal  No c/o anxiety this evening. Problem: Altered Thought Process (Adult/Pediatric)  Goal: *STG: Remains safe in hospital  Outcome: Progressing Towards Goal  Pt demonstrates no acting out behaviors. Goal: *STG: Decreased hallucinations  Outcome: Progressing Towards Goal  Pt observed standing in one place in his room staring at the ceiling. Staff asks pt wgat he is doing. Pt states, \"I am looking at he air. \"

## 2017-11-07 NOTE — PROGRESS NOTES
Admission Medication Reconciliation:    Information obtained from:  Review of Rx Query records    Comments/Recommendations: Updated PTA meds/reviewed patient's allergies.     1)  Previous medication history:        - 11/7/2016: citalopram 20mg daily        - 12/23/2016: sertraline 25mg daily        - 12/23/2016: risperidone 0.5mg QHS       Significant PMH/Disease States:   Past Medical History:   Diagnosis Date    Migraine      Chief Complaint for this Admission:    Chief Complaint   Patient presents with   [address] Problem    Other     head twitching      Allergies:  Seafood    Prior to Admission Medications:   None

## 2017-11-07 NOTE — BH NOTES
Behavioral Health Transition Record to Provider    Patient Name: Brigette Vyas  YOB: 1990  Medical Record Number: [id number]  Date of Admission: 11/3/2017  Date of Discharge: 11/7/2017    Attending Provider: Kwan Murphy MD  Discharging Provider: Kwan Murphy MD  To contact this individual call [phone] and ask the  to page. If unavailable, ask to be transferred to [facility] Provider on call. A Behavioral Health Provider will be available on call 24/7 and during holidays     Primary Care Provider: None    Allergies   Allergen Reactions    Seafood Hives          H&P Summary Notes      H&P by Bernadette Everett MD at 11/04/17 1013     Author:  Bernadette Everett MD Service:  PSYCHIATRY Author Type:  Physician    Filed:  11/04/17 1022 Date of Service:  11/04/17 1013 Status:  Signed    :  Bernadette Everett MD (Physician)             INITIAL PSYCHIATRIC EVALUATION            IDENTIFICATION:    Patient Name  Brigette Vyas   Date of Birth 1990   [facility] [id number]   Medical Record Number  [id number]      Age  24 y.o. PCP None   Admit date:  11/3/2017    Room Number  731/01  @ [facility]   Date of Service  11/4/2017            HISTORY         REASON FOR HOSPITALIZATION:  CC: Pt admitted under a voluntary basis for severe depression with suicidal ideations and  psychosis proving to be an imminent danger to self and and an inability to care for self. HISTORY OF PRESENT ILLNESS:    The patient, Brigette Vyas, is a 24 y.o. BLACK OR  male with a past psychiatric history significant for depression and anxiety , who presents at this time with complaints of (and/or evidence of) the following emotional symptoms: depression, suicidal thoughts/threats, agitation and anxiety. Additional symptomatology include anxiety, depression worse, feeling depressed, feeling suicidal and financial problems. The above symptoms have been present for years .  These symptoms are of moderate  severity. These symptoms are constant in nature. The patient's condition has been precipitated by financial problems and psychosocial stressors (problems with family and legal problems  ). He stated he hears voices and hears people call his name and interrupt him focusing and distract him and saying negative things , he is not feeling well today and he is anxious      ALLERGIES:   Allergies   Allergen Reactions    Seafood Hives      MEDICATIONS PRIOR TO ADMISSION:   No prescriptions prior to admission. PAST MEDICAL HISTORY:   Past Medical History:   Diagnosis Date    Migraine    History reviewed. No pertinent surgical history. SOCIAL HISTORY: single and lives with grandmother    Social History     Social History    Marital status: SINGLE     Spouse name: N/A    Number of children: N/A    Years of education: N/A     Occupational History    Not on file. Social History Main Topics    Smoking status: Current Some Day Smoker    Smokeless tobacco: Never Used    Alcohol use No    Drug use: No    Sexual activity: Not on file     Other Topics Concern    Not on file     Social History Narrative      FAMILY HISTORY: History reviewed. No pertinent family history. History reviewed. No pertinent family history. REVIEW OF SYSTEMS:   History obtained from the patient  Pertinent items are noted in the History of Present Illness. All other Systems reviewed and are considered negative. MENTAL STATUS EXAM & VITALS     MENTAL STATUS EXAM (MSE):    MSE FINDINGS ARE WITHIN NORMAL LIMITS (WNL) UNLESS OTHERWISE STATED BELOW. ( ALL OF THE BELOW CATEGORIES OF THE MSE HAVE BEEN REVIEWED (reviewed 11/4/2017) AND UPDATED AS DEEMED APPROPRIATE )  General Presentation age appropriate, guarded   Orientation oriented to time, place and person   Vital Signs  See below (reviewed 11/4/2017); Vital Signs (BP, Pulse, & Temp) are within normal limits if not listed below.    Gait and Station [place], no ataxia   Musculoskeletal System No extrapyramidal symptoms (EPS); no abnormal muscular movements or Tardive Dyskinesia (TD); muscle strength and tone are within normal limits   Language No aphasia or dysarthria   Speech:  normal pitch and normal volume   Thought Processes logical; normal rate of thoughts; poor abstract reasoning/computation   Thought Associations goal directed   Thought Content free of delusions   Suicidal Ideations no plan    Homicidal Ideations no plan    Mood:  anxious    Affect:  anxious   Memory recent  fair   Memory remote:  fair   Concentration/Attention:  fair   Fund of Knowledge average   Insight:  fair   Reliability poor   Judgment:  fair          VITALS:     Patient Vitals for the past 24 hrs:   Temp Pulse Resp BP SpO2   11/04/17 0800 98.2 °F (36.8 °C) 61 17 134/71 94 %   11/04/17 0154 97.8 °F (36.6 °C) 76 17 121/82 100 %   11/04/17 0129 99.1 °F (37.3 °C) 82 16 121/74 100 %   11/03/17 2320 98.6 °F (37 °C) 83 16 131/83 99 %   11/03/17 2115 98.8 °F (37.1 °C) 89 18 101/66 100 %   11/03/17 1845 98.8 °F (37.1 °C) (!) 114 22 133/78 100 %     Wt Readings from Last 3 Encounters:   11/03/17 57.2 kg (126 lb)   02/07/15 61.2 kg (135 lb)     Temp Readings from Last 3 Encounters:   11/04/17 98.2 °F (36.8 °C)   02/07/15 98.1 °F (36.7 °C)     BP Readings from Last 3 Encounters:   11/04/17 134/71   02/07/15 119/75     Pulse Readings from Last 3 Encounters:   11/04/17 61   02/07/15 72            DATA     LABORATORY DATA:  Labs Reviewed   CBC WITH AUTOMATED DIFF - Abnormal; Notable for the following:        Result Value    NEUTROPHILS 78 (*)     MONOCYTES 4 (*)     All other components within normal limits   METABOLIC PANEL, COMPREHENSIVE - Abnormal; Notable for the following:     Glucose 124 (*)     BUN/Creatinine ratio 10 (*)     AST (SGOT) 14 (*)     All other components within normal limits   URINALYSIS W/ REFLEX CULTURE - Abnormal; Notable for the following:     Protein TRACE (*)     Ketone TRACE (*)     Mucus 1+ (*)     All other components within normal limits   ACETAMINOPHEN - Abnormal; Notable for the following:     Acetaminophen level <2 (*)     All other components within normal limits   SALICYLATE - Abnormal; Notable for the following:     Salicylate level <7.7 (*)     All other components within normal limits   SAMPLES BEING HELD   DRUG SCREEN, URINE   ETHYL ALCOHOL     Admission on 11/03/2017   Component Date Value Ref Range Status    WBC 11/03/2017 7.3  4.1 - 11.1 K/uL Final    RBC 11/03/2017 5.32  4.10 - 5.70 M/uL Final    HGB 11/03/2017 16.3  12.1 - 17.0 g/dL Final    HCT 11/03/2017 47.4  36.6 - 50.3 % Final    MCV 11/03/2017 89.1  80.0 - 99.0 FL Final    MCH 11/03/2017 30.6  26.0 - 34.0 PG Final    MCHC 11/03/2017 34.4  30.0 - 36.5 g/dL Final    RDW 11/03/2017 12.5  11.5 - 14.5 % Final    PLATELET 29/71/0555 541  150 - 400 K/uL Final    NEUTROPHILS 11/03/2017 78* 32 - 75 % Final    LYMPHOCYTES 11/03/2017 17  12 - 49 % Final    MONOCYTES 11/03/2017 4* 5 - 13 % Final    EOSINOPHILS 11/03/2017 1  0 - 7 % Final    BASOPHILS 11/03/2017 0  0 - 1 % Final    ABS. NEUTROPHILS 11/03/2017 5.7  1.8 - 8.0 K/UL Final    ABS. LYMPHOCYTES 11/03/2017 1.2  0.8 - 3.5 K/UL Final    ABS. MONOCYTES 11/03/2017 0.3  0.0 - 1.0 K/UL Final    ABS. EOSINOPHILS 11/03/2017 0.1  0.0 - 0.4 K/UL Final    ABS.  BASOPHILS 11/03/2017 0.0  0.0 - 0.1 K/UL Final    Sodium 11/03/2017 140  136 - 145 mmol/L Final    Potassium 11/03/2017 3.6  3.5 - 5.1 mmol/L Final    Chloride 11/03/2017 106  97 - 108 mmol/L Final    CO2 11/03/2017 25  21 - 32 mmol/L Final    Anion gap 11/03/2017 9  5 - 15 mmol/L Final    Glucose 11/03/2017 124* 65 - 100 mg/dL Final    BUN 11/03/2017 12  6 - 20 MG/DL Final    Creatinine 11/03/2017 1.23  0.70 - 1.30 MG/DL Final    BUN/Creatinine ratio 11/03/2017 10* 12 - 20   Final    GFR est AA 11/03/2017 >60  >60 ml/min/1.73m2 Final    GFR est non-AA 11/03/2017 >60  >60 ml/min/1.73m2 Final  Calcium 11/03/2017 8.8  8.5 - 10.1 MG/DL Final    Bilirubin, total 11/03/2017 0.6  0.2 - 1.0 MG/DL Final    ALT (SGPT) 11/03/2017 21  12 - 78 U/L Final    AST (SGOT) 11/03/2017 14* 15 - 37 U/L Final    Alk.  phosphatase 11/03/2017 48  45 - 117 U/L Final    Protein, total 11/03/2017 7.8  6.4 - 8.2 g/dL Final    Albumin 11/03/2017 4.1  3.5 - 5.0 g/dL Final    Globulin 11/03/2017 3.7  2.0 - 4.0 g/dL Final    A-G Ratio 11/03/2017 1.1  1.1 - 2.2   Final    Color 11/03/2017 YELLOW/STRAW    Final    Appearance 11/03/2017 CLEAR  CLEAR   Final    Specific gravity 11/03/2017 1.030  1.003 - 1.030   Final    pH (UA) 11/03/2017 8.0  5.0 - 8.0   Final    Protein 11/03/2017 TRACE* NEG mg/dL Final    Glucose 11/03/2017 NEGATIVE   NEG mg/dL Final    Ketone 11/03/2017 TRACE* NEG mg/dL Final    Bilirubin 11/03/2017 NEGATIVE   NEG   Final    Blood 11/03/2017 NEGATIVE   NEG   Final    Urobilinogen 11/03/2017 1.0  0.2 - 1.0 EU/dL Final    Nitrites 11/03/2017 NEGATIVE   NEG   Final    Leukocyte Esterase 11/03/2017 NEGATIVE   NEG   Final    WBC 11/03/2017 0-4  0 - 4 /hpf Final    RBC 11/03/2017 0-5  0 - 5 /hpf Final    Epithelial cells 11/03/2017 FEW  FEW /lpf Final    Bacteria 11/03/2017 NEGATIVE   NEG /hpf Final    UA:UC IF INDICATED 11/03/2017 CULTURE NOT INDICATED BY UA RESULT  CNI   Final    Mucus 11/03/2017 1+* NEG /lpf Final    AMPHETAMINES 11/03/2017 NEGATIVE   NEG   Final    BARBITURATES 11/03/2017 NEGATIVE   NEG   Final    BENZODIAZEPINES 11/03/2017 NEGATIVE   NEG   Final    COCAINE 11/03/2017 NEGATIVE   NEG   Final    METHADONE 11/03/2017 NEGATIVE   NEG   Final    OPIATES 11/03/2017 NEGATIVE   NEG   Final    PCP(PHENCYCLIDINE) 11/03/2017 NEGATIVE   NEG   Final    THC (TH-CANNABINOL) 11/03/2017 NEGATIVE   NEG   Final    Drug screen comment 11/03/2017 (NOTE)   Final    ALCOHOL(ETHYL),SERUM 11/03/2017 <10  <10 MG/DL Final    Acetaminophen level 11/03/2017 <2* 10 - 30 ug/mL Final    Salicylate level 00/87/9197 <1.7* 2.8 - 20.0 MG/DL Final        RADIOLOGY REPORTS:    Results from Hospital Encounter encounter on 02/07/15   XR CHEST PA LAT   Narrative **Final Report**      ICD Codes / Adm. Diagnosis: 002900   / Chest Pain  migraine  Examination:  CR CHEST PA AND LATERAL  - [id number] - Feb 7 2015 12:21PM  Accession No:  [id number]  Reason:  Chest Pain      REPORT:  Exam:  2 view chest    Indication: Chest pain    PA and lateral views demonstrate normal heart size. There is no acute   process in the lung fields. The osseous structures are unremarkable. IMPRESSION: No acute process. Signing/Reading Doctor: Violette Bhatt ([id number])    Approved: Violette Pill ([id number])  Feb 7 2015 12:22PM                               No results found.            MEDICATIONS       ALL MEDICATIONS  Current Facility-Administered Medications   Medication Dose Route Frequency    ziprasidone (GEODON) 20 mg in sterile water (preservative free) 1 mL injection  20 mg IntraMUSCular BID PRN    OLANZapine (ZyPREXA) tablet 5 mg  5 mg Oral Q6H PRN    benztropine (COGENTIN) tablet 2 mg  2 mg Oral BID PRN    benztropine (COGENTIN) injection 2 mg  2 mg IntraMUSCular BID PRN    LORazepam (ATIVAN) injection 2 mg  2 mg IntraMUSCular Q4H PRN    LORazepam (ATIVAN) tablet 1 mg  1 mg Oral Q4H PRN    zolpidem (AMBIEN) tablet 10 mg  10 mg Oral QHS PRN    acetaminophen (TYLENOL) tablet 650 mg  650 mg Oral Q4H PRN    ibuprofen (MOTRIN) tablet 400 mg  400 mg Oral Q8H PRN    magnesium hydroxide (MILK OF MAGNESIA) 400 mg/5 mL oral suspension 30 mL  30 mL Oral DAILY PRN    nicotine (NICODERM CQ) 21 mg/24 hr patch 1 Patch  1 Patch TransDERmal DAILY PRN      SCHEDULED MEDICATIONS  Current Facility-Administered Medications   Medication Dose Route Frequency                ASSESSMENT & PLAN        The patient, Kennedi Singh, is a 24 y.o.  male who presents at this time for treatment of the following diagnoses:  Patient Active Hospital Problem List:   Depression with anxiety (11/4/2017)    Assessment: sadness and anxiety     Plan: initiate Zoloft 25 mg po daily            I will continue to monitor blood levels (Depakote, Tegretol, lithium, clozapine---a drug with a narrow therapeutic index= NTI) and associated labs for drug therapy implemented that require intense monitoring for toxicity as deemed appropriate based on current medication side effects and pharmacodynamically determined drug 1/2 lives. A coordinated, multidisplinary treatment team (includes the nurse, unit pharmcist,  and writer) round was conducted for this initial evaluation with the patient present. The following regarding medications was addressed during rounds with patient:   the risks and benefits of the proposed medication. The patient was given the opportunity to ask questions. Informed consent given to the use of the above medications. I will continue to adjust psychiatric and non-psychiatric medications (see above \"medication\" section and orders section for details) as deemed appropriate & based upon diagnoses and response to treatment. I have reviewed admission (and previous/old) labs and medical tests in the EHR and or transferring hospital documents. I will continue to order blood tests/labs and diagnostic tests as deemed appropriate and review results as they become available (see orders for details). I have reviewed old psychiatric and medical records available in the EHR. I Will order additional psychiatric records from other institutions to further elucidate the nature of patient's psychopathology and review once available. I will gather additional collateral information from friends, family and o/p treatment team to further elucidate the nature of patient's psychopathology and baselline level of psychiatric functioning.       ESTIMATED LENGTH OF STAY:    3       STRENGTHS:  Access to housing/residential stability, Interpersonal/supportive relationships (family, friends, peers) and Knowledge of medications                                        SIGNED:    Hamzah Cunningham MD  11/4/2017[WF1.1]       Revision History       User Key Date/Time User Provider Type Action    > WF1.1 11/04/17 1022 Hamzah Cunningham MD Physician Sign              Admission Diagnosis: Depression with anxiety    * No surgery found *    Results for orders placed or performed during the hospital encounter of 11/03/17   CBC WITH AUTOMATED DIFF   Result Value Ref Range    WBC 7.3 4.1 - 11.1 K/uL    RBC 5.32 4.10 - 5.70 M/uL    HGB 16.3 12.1 - 17.0 g/dL    HCT 47.4 36.6 - 50.3 %    MCV 89.1 80.0 - 99.0 FL    MCH 30.6 26.0 - 34.0 PG    MCHC 34.4 30.0 - 36.5 g/dL    RDW 12.5 11.5 - 14.5 %    PLATELET 925 002 - 438 K/uL    NEUTROPHILS 78 (H) 32 - 75 %    LYMPHOCYTES 17 12 - 49 %    MONOCYTES 4 (L) 5 - 13 %    EOSINOPHILS 1 0 - 7 %    BASOPHILS 0 0 - 1 %    ABS. NEUTROPHILS 5.7 1.8 - 8.0 K/UL    ABS. LYMPHOCYTES 1.2 0.8 - 3.5 K/UL    ABS. MONOCYTES 0.3 0.0 - 1.0 K/UL    ABS. EOSINOPHILS 0.1 0.0 - 0.4 K/UL    ABS. BASOPHILS 0.0 0.0 - 0.1 K/UL   METABOLIC PANEL, COMPREHENSIVE   Result Value Ref Range    Sodium 140 136 - 145 mmol/L    Potassium 3.6 3.5 - 5.1 mmol/L    Chloride 106 97 - 108 mmol/L    CO2 25 21 - 32 mmol/L    Anion gap 9 5 - 15 mmol/L    Glucose 124 (H) 65 - 100 mg/dL    BUN 12 6 - 20 MG/DL    Creatinine 1.23 0.70 - 1.30 MG/DL    BUN/Creatinine ratio 10 (L) 12 - 20      GFR est AA >60 >60 ml/min/1.73m2    GFR est non-AA >60 >60 ml/min/1.73m2    Calcium 8.8 8.5 - 10.1 MG/DL    Bilirubin, total 0.6 0.2 - 1.0 MG/DL    ALT (SGPT) 21 12 - 78 U/L    AST (SGOT) 14 (L) 15 - 37 U/L    Alk.  phosphatase 48 45 - 117 U/L    Protein, total 7.8 6.4 - 8.2 g/dL    Albumin 4.1 3.5 - 5.0 g/dL    Globulin 3.7 2.0 - 4.0 g/dL    A-G Ratio 1.1 1.1 - 2.2     URINALYSIS W/ REFLEX CULTURE   Result Value Ref Range    Color YELLOW/STRAW      Appearance CLEAR CLEAR      Specific gravity 1.030 1.003 - 1.030      pH (UA) 8.0 5.0 - 8.0      Protein TRACE (A) NEG mg/dL    Glucose NEGATIVE  NEG mg/dL    Ketone TRACE (A) NEG mg/dL    Bilirubin NEGATIVE  NEG      Blood NEGATIVE  NEG      Urobilinogen 1.0 0.2 - 1.0 EU/dL    Nitrites NEGATIVE  NEG      Leukocyte Esterase NEGATIVE  NEG      WBC 0-4 0 - 4 /hpf    RBC 0-5 0 - 5 /hpf    Epithelial cells FEW FEW /lpf    Bacteria NEGATIVE  NEG /hpf    UA:UC IF INDICATED CULTURE NOT INDICATED BY UA RESULT CNI      Mucus 1+ (A) NEG /lpf   DRUG SCREEN, URINE   Result Value Ref Range    AMPHETAMINES NEGATIVE  NEG      BARBITURATES NEGATIVE  NEG      BENZODIAZEPINES NEGATIVE  NEG      COCAINE NEGATIVE  NEG      METHADONE NEGATIVE  NEG      OPIATES NEGATIVE  NEG      PCP(PHENCYCLIDINE) NEGATIVE  NEG      THC (TH-CANNABINOL) NEGATIVE  NEG      Drug screen comment (NOTE)    ETHYL ALCOHOL   Result Value Ref Range    ALCOHOL(ETHYL),SERUM <10 <10 MG/DL   ACETAMINOPHEN   Result Value Ref Range    Acetaminophen level <2 (L) 10 - 30 ug/mL   SALICYLATE   Result Value Ref Range    Salicylate level <1.0 (L) 2.8 - 20.0 MG/DL       Immunizations administered during this encounter: There is no immunization history on file for this patient. Screening for Metabolic Disorders for Patients on Antipsychotic Medications  (Data obtained from the EMR)    Estimated Body Mass Index  Estimated body mass index is 19.64 kg/(m^2) as calculated from the following:    Height as of this encounter: 5' 6\" (1.676 m). Weight as of this encounter: 55.2 kg (121 lb 11.2 oz).      Vital Signs/Blood Pressure  Visit Vitals    /85    Pulse 64    Temp 98.3 °F (36.8 °C)    Resp 16    Ht 5' 6\" (1.676 m)    Wt 55.2 kg (121 lb 11.2 oz)    SpO2 100%    BMI 19.64 kg/m2       Blood Glucose/Hemoglobin A1c  Lab Results   Component Value Date/Time    Glucose 124 11/03/2017 07:03 PM        No results found for: HBA1C, HGBE8, QPW1UBIL     Lipid Panel  No results found for: CHOL, CHOLX, CHLST, CHOLV, 734400, HDL, LDL, LDLC, DLDLP, TGLX, TRIGL, TRIGP, CHHD, CHHDX    Discharge Diagnosis: Depression with anxiety (ICD-10-CM: F41.8)    Discharge Plan: Patient discharged home into the care of his uncle. DISCHARGE SUMMARY    Alecia Acevedo  : 1990  MRN: [id number]    The patient Muñoz Bartow exhibits the ability to control behavior in a less restrictive environment. Patient's level of functioning is improving. No assaultive/destructive behavior has been observed for the past 24 hours. No suicidal/homicidal threat or behavior has been observed for the past 24 hours. There is no evidence of serious medication side effects. Patient has not been in physical or protective restraints for at least the past 24 hours. If weapons involved, how are they secured? No weapons involved. Is patient aware of and in agreement with discharge plan? Yes    Arrangements for medication:  Prescriptions given to patient. Copy of discharge instructions to  provider?:  [facility] (Attn: Carrillo Carlos)    Arrangements for transportation home:  Family to . Keep all follow up appointments as scheduled, continue to take prescribed medications per physician instructions. Mental health crisis number:  [phone] or your local mental health crisis line number at [phone]. Discharge Medication List and Instructions:   Discharge Medication List as of 2017  1:08 PM      START taking these medications    Details   sertraline (ZOLOFT) 25 mg tablet Take 1 Tab by mouth daily. Indications: ANXIETY WITH DEPRESSION, Print, Disp-15 Tab, R-1             Unresulted Labs (24h ago through future)    Start     Ordered    17 0400  TSH, 3RD GENERATION - DO NOT ORDER IF PATIENT HAS RECEIVED THIS LAB WITHIN THE LAST 8 WEEKS  TOMORROW AM,   R     Comments:  Do not repeat lab if already done in the ED prior to arrival on unit.     17 1317        To obtain results of studies pending at discharge, please contact N/A.    Follow-up Information     Follow up With Details Comments Contact Info    None   None ([phone]) Patient stated that they have no PCP      [address] Schedule an appointment as soon as possible for a visit Make an appointment with Michelle Muller. [address]  [phone]          Advanced Directive:   Does the patient have an appointed surrogate decision maker? No  Does the patient have a Medical Advance Directive? No  Does the patient have a Psychiatric Advance Directive? No  If the patient does not have a surrogate or Medical Advance Directive AND Psychiatric Advance Directive, the patient was offered information on these advance directives Yes and Patient declined to complete      Patient Instructions: Please continue all medications until otherwise directed by physician. Recommended activity: Activity as tolerated,     If you experience any of the following symptoms thoughts of harming self, feeling overwhelmed with anxiety and sadness, please follow up with your assigned providers and local crisis number. *  Please give a list of your current medications to your Primary Care Provider. *  Please update this list whenever your medications are discontinued, doses are      changed, or new medications (including over-the-counter products) are added. *  Please carry medication information at all times in case of emergency situations. Tobacco Cessation Discharge Plan:   Is the patient a smoker and needs referral for smoking cessation? Yes  Patient referred to the following for smoking cessation with an appointment? Refused     Patient was offered medication to assist with smoking cessation at discharge? Refused  Was education for smoking cessation added to the discharge instructions? Yes    Alcohol/Substance Abuse Discharge Plan:   Does the patient have a history of substance/alcohol abuse and requires a referral for treatment?  No  Patient referred to the following for substance/alcohol abuse treatment with an appointment? Not applicable  Patient was offered medication to assist with alcohol cessation at discharge? Not applicable  Was education for substance/alcohol abuse added to discharge instructions? Not applicable    Patient discharged to Home; discussed with patient/caregiver and provided to the patient/caregiver either in hard copy or electronically.

## 2017-11-07 NOTE — DISCHARGE SUMMARY
PSYCHIATRIC DISCHARGE SUMMARY         IDENTIFICATION:    Patient Name  Lorene Leonardo   Date of Birth 1990   [facility] [id number]   Medical Record Number  [id number]      Age  24 y.o. PCP None   Admit date:  11/3/2017    Discharge date: 11/7/2017   Room Number  731/01  @ [facility]   Date of Service  11/7/2017            TYPE OF DISCHARGE: REGULAR               CONDITION AT DISCHARGE: improved       PROVISIONAL & DISCHARGE DIAGNOSES:    Problem List  Never Reviewed          Codes Class    * (Principal)Depression with anxiety ICD-10-CM: F41.8  ICD-9-CM: 300.4               Active Hospital Problems    *Depression with anxiety        DISCHARGE DIAGNOSIS:   Axis I:  SEE ABOVE  Axis II: Intellectual Delay  Axis III: Tic Disorder  Axis IV: limited ability to live alone;  lack of structure  Axis V:  15 on admission, 45 on discharge 45(baseline)       CC & HISTORY OF PRESENT ILLNESS:  The patient, Lorene Leonardo, is a 24 y.o. BLACK OR  male with a past psychiatric history significant for depression and anxiety , who presents at this time with complaints of (and/or evidence of) the following emotional symptoms: depression, suicidal thoughts/threats, agitation and anxiety. Additional symptomatology include anxiety, depression worse, feeling depressed, feeling suicidal and financial problems. The above symptoms have been present for years . These symptoms are of moderate  severity. These symptoms are constant in nature. The patient's condition has been precipitated by financial problems and psychosocial stressors (problems with family and legal problems  ).  He stated he hears voices and hears people call his name and interrupt him focusing and distract him and saying negative things , he is not feeling well today and he is anxious      SOCIAL HISTORY:    Social History     Social History    Marital status: SINGLE     Spouse name: N/A    Number of children: N/A    Years of education: N/A Occupational History    Not on file. Social History Main Topics    Smoking status: Current Some Day Smoker    Smokeless tobacco: Never Used    Alcohol use No    Drug use: No    Sexual activity: Not on file     Other Topics Concern    Not on file     Social History Narrative      FAMILY HISTORY:   History reviewed. No pertinent family history. HOSPITALIZATION COURSE:    Doug Mays was [facility] for acute psychiatric stabilization in regards to symptomatology as described in the HPI above. The differential diagnosis at time of admission included: Intellectual delay, MDD with anxiety. While on the unit Doug Mays was involved in individual, group, occupational and milieu therapy. Psychiatric medications were adjusted during this hospitalization including Zoloft 25mg daily. Doug Mays demonstrated a slow, but progressive improvement in overall condition. Much of patient's depression appeared to be related to situational stressors, and psychological factors. Please see individual progress notes for more specific details regarding patient's hospitalization course. At time of discharge, Doug Mays is without significant problems of depression, anxiety or psychosis. Patient free of suicidal and homicidal ideations (appears to be at very low risk of suicide or homicide) and reports many positive predictive factors in terms of not attempting suicide or homicide. Overall presentation at time of discharge is most consistent with the diagnosis of MDD with anxiety and ID. Patient with request for discharge today. There are no grounds to seek a TDO. Patient has maximized benefit to be derived from acute inpatient psychiatric treatment. All members of the treatment team concur with each other in regards to plans for discharge today per patient's request.  Patient and family are aware and in agreement with discharge and discharge plan. LABS AND IMAGING:    Labs Reviewed   CBC WITH AUTOMATED DIFF - Abnormal; Notable for the following:        Result Value    NEUTROPHILS 78 (*)     MONOCYTES 4 (*)     All other components within normal limits   METABOLIC PANEL, COMPREHENSIVE - Abnormal; Notable for the following:     Glucose 124 (*)     BUN/Creatinine ratio 10 (*)     AST (SGOT) 14 (*)     All other components within normal limits   URINALYSIS W/ REFLEX CULTURE - Abnormal; Notable for the following:     Protein TRACE (*)     Ketone TRACE (*)     Mucus 1+ (*)     All other components within normal limits   ACETAMINOPHEN - Abnormal; Notable for the following:     Acetaminophen level <2 (*)     All other components within normal limits   SALICYLATE - Abnormal; Notable for the following:     Salicylate level <5.8 (*)     All other components within normal limits   SAMPLES BEING HELD   DRUG SCREEN, URINE   ETHYL ALCOHOL   TSH 3RD GENERATION     No results found for: DS35, PHEN, PHENO, PHENT, DILF, DS39, PHENY, PTN, VALF2, VALAC, VALP, VALPR, DS6, CRBAM, CRBAMP, CARB2, XCRBAM  Admission on 11/03/2017, Discharged on 11/07/2017   Component Date Value Ref Range Status    WBC 11/03/2017 7.3  4.1 - 11.1 K/uL Final    RBC 11/03/2017 5.32  4.10 - 5.70 M/uL Final    HGB 11/03/2017 16.3  12.1 - 17.0 g/dL Final    HCT 11/03/2017 47.4  36.6 - 50.3 % Final    MCV 11/03/2017 89.1  80.0 - 99.0 FL Final    MCH 11/03/2017 30.6  26.0 - 34.0 PG Final    MCHC 11/03/2017 34.4  30.0 - 36.5 g/dL Final    RDW 11/03/2017 12.5  11.5 - 14.5 % Final    PLATELET 28/56/9161 923  150 - 400 K/uL Final    NEUTROPHILS 11/03/2017 78* 32 - 75 % Final    LYMPHOCYTES 11/03/2017 17  12 - 49 % Final    MONOCYTES 11/03/2017 4* 5 - 13 % Final    EOSINOPHILS 11/03/2017 1  0 - 7 % Final    BASOPHILS 11/03/2017 0  0 - 1 % Final    ABS. NEUTROPHILS 11/03/2017 5.7  1.8 - 8.0 K/UL Final    ABS. LYMPHOCYTES 11/03/2017 1.2  0.8 - 3.5 K/UL Final    ABS.  MONOCYTES 11/03/2017 0.3 0.0 - 1.0 K/UL Final    ABS. EOSINOPHILS 11/03/2017 0.1  0.0 - 0.4 K/UL Final    ABS. BASOPHILS 11/03/2017 0.0  0.0 - 0.1 K/UL Final    Sodium 11/03/2017 140  136 - 145 mmol/L Final    Potassium 11/03/2017 3.6  3.5 - 5.1 mmol/L Final    Chloride 11/03/2017 106  97 - 108 mmol/L Final    CO2 11/03/2017 25  21 - 32 mmol/L Final    Anion gap 11/03/2017 9  5 - 15 mmol/L Final    Glucose 11/03/2017 124* 65 - 100 mg/dL Final    BUN 11/03/2017 12  6 - 20 MG/DL Final    Creatinine 11/03/2017 1.23  0.70 - 1.30 MG/DL Final    BUN/Creatinine ratio 11/03/2017 10* 12 - 20   Final    GFR est AA 11/03/2017 >60  >60 ml/min/1.73m2 Final    GFR est non-AA 11/03/2017 >60  >60 ml/min/1.73m2 Final    Calcium 11/03/2017 8.8  8.5 - 10.1 MG/DL Final    Bilirubin, total 11/03/2017 0.6  0.2 - 1.0 MG/DL Final    ALT (SGPT) 11/03/2017 21  12 - 78 U/L Final    AST (SGOT) 11/03/2017 14* 15 - 37 U/L Final    Alk.  phosphatase 11/03/2017 48  45 - 117 U/L Final    Protein, total 11/03/2017 7.8  6.4 - 8.2 g/dL Final    Albumin 11/03/2017 4.1  3.5 - 5.0 g/dL Final    Globulin 11/03/2017 3.7  2.0 - 4.0 g/dL Final    A-G Ratio 11/03/2017 1.1  1.1 - 2.2   Final    Color 11/03/2017 YELLOW/STRAW    Final    Appearance 11/03/2017 CLEAR  CLEAR   Final    Specific gravity 11/03/2017 1.030  1.003 - 1.030   Final    pH (UA) 11/03/2017 8.0  5.0 - 8.0   Final    Protein 11/03/2017 TRACE* NEG mg/dL Final    Glucose 11/03/2017 NEGATIVE   NEG mg/dL Final    Ketone 11/03/2017 TRACE* NEG mg/dL Final    Bilirubin 11/03/2017 NEGATIVE   NEG   Final    Blood 11/03/2017 NEGATIVE   NEG   Final    Urobilinogen 11/03/2017 1.0  0.2 - 1.0 EU/dL Final    Nitrites 11/03/2017 NEGATIVE   NEG   Final    Leukocyte Esterase 11/03/2017 NEGATIVE   NEG   Final    WBC 11/03/2017 0-4  0 - 4 /hpf Final    RBC 11/03/2017 0-5  0 - 5 /hpf Final    Epithelial cells 11/03/2017 FEW  FEW /lpf Final    Bacteria 11/03/2017 NEGATIVE   NEG /hpf Final    UA:UC IF INDICATED 11/03/2017 CULTURE NOT INDICATED BY UA RESULT  CNI   Final    Mucus 11/03/2017 1+* NEG /lpf Final    AMPHETAMINES 11/03/2017 NEGATIVE   NEG   Final    BARBITURATES 11/03/2017 NEGATIVE   NEG   Final    BENZODIAZEPINES 11/03/2017 NEGATIVE   NEG   Final    COCAINE 11/03/2017 NEGATIVE   NEG   Final    METHADONE 11/03/2017 NEGATIVE   NEG   Final    OPIATES 11/03/2017 NEGATIVE   NEG   Final    PCP(PHENCYCLIDINE) 11/03/2017 NEGATIVE   NEG   Final    THC (TH-CANNABINOL) 11/03/2017 NEGATIVE   NEG   Final    Drug screen comment 11/03/2017 (NOTE)   Final    ALCOHOL(ETHYL),SERUM 11/03/2017 <10  <10 MG/DL Final    Acetaminophen level 11/03/2017 <2* 10 - 30 ug/mL Final    Salicylate level 82/71/7707 <1.7* 2.8 - 20.0 MG/DL Final     No results found. DISPOSITION:    Home. Patient to f/u with psychiatric, and psychotherapy appointments. Patient is to f/u with internist as directed. FOLLOW-UP CARE:    Activity as tolerated  Regular Diet  Wound Care: none needed. Follow-up Information     Follow up With Details Comments Contact Info    None   None ([phone]) Patient stated that they have no PCP      [address] Schedule an appointment as soon as possible for a visit Make an [facility]  [phone]                 PROGNOSIS:   Fair---- based on nature of patient's pathology/ies and treatment compliance issues. Prognosis is greatly dependent upon patient's ability to remain sober and to follow up with psychiatric/psychotherapy appointments as well as to comply with psychiatric medications as prescribed. DISCHARGE MEDICATIONS: (no changes made). Informed consent given for the use of following psychotropic medications:  Discharge Medication List as of 11/7/2017  1:08 PM      START taking these medications    Details   sertraline (ZOLOFT) 25 mg tablet Take 1 Tab by mouth daily.  Indications: ANXIETY WITH DEPRESSION, Print, Disp-15 Tab, R-1                    A coordinated, multidisplinary treatment team round was conducted with Elizabeth Robbins is done daily here at [facility]. This team consists of the nurse, psychiatric unit pharmcist,  and writer. I have spent greater than 35 minutes on discharge work.     Signed:  Dorothy Wilde MD  11/7/2017

## 2017-11-07 NOTE — BH NOTES
PSYCHIATRIC PROGRESS NOTE         Patient Name  Jacek Francis   Date of Birth 1990   [facility] [id number]   Medical Record Number  [id number]      Age  24 y.o. PCP None   Admit date:  11/3/2017    Room Number  731/01  @ [facility]   Date of Service  11/6/2017          PSYCHOTHERAPY SESSION NOTE:  Length of psychotherapy session: 15 minutes    Main condition/diagnosis/issues treated during session today, 11/6/2017 : anxiety and depression     I employed Cognitive Behavioral therapy techniques, Reality-Oriented psychotherapy, as well as supportive psychotherapy in regards to various ongoing psychosocial stressors, including the following: pre-admission and current problems; housing issues; occupational issues; academic issues; legal issues; medical issues; and stress of hospitalization. Interpersonal relationship issues and psychodynamic conflicts explored. Attempts made to alleviate maladaptive patterns. We, also, worked on issues of denial & effects of substance dependency/use     Overall, patient is not progressing    Treatment Plan Update (reviewed an updated 11/6/2017) : I will modify psychotherapy tx plan by implementing more stress management strategies, building upon cognitive behavioral techniques, increasing coping skills, as well as shoring up psychological defenses). An extended energy and skill set was needed to engage pt in psychotherapy due to some of the following: resistiveness, complexity, negativity, confrontational nature, hostile behaviors, and/or severe abnormalities in thought processes/psychosis resulting in the loss of expressive/receptive language communication skills. E & M PROGRESS NOTE:         HISTORY       CC: Pt admitted under a voluntary basis for severe depression with suicidal ideations and  psychosis proving to be an imminent danger to self and and an inability to care for self.     HISTORY OF PRESENT ILLNESS:    The patient, Jcaek Francis, is a 24 y.o. BLACK OR  male with a past psychiatric history significant for depression and anxiety , who presents at this time with complaints of (and/or evidence of) the following emotional symptoms: depression, suicidal thoughts/threats, agitation and anxiety. Additional symptomatology include anxiety, depression worse, feeling depressed, feeling suicidal and financial problems. The above symptoms have been present for years . These symptoms are of moderate  severity. These symptoms are constant in nature. The patient's condition has been precipitated by financial problems and psychosocial stressors (problems with family and legal problems  ). He stated he hears voices and hears people call his name and interrupt him focusing and distract him and saying negative things , he is not feeling well today and he is anxious   11/5/17 he stated he is feeling anxious and he gets shaky and has involuntary movement in his neck and had that before while he is home and he got panic attack and he received medication and he has no aggressive behavior and no self harm behavior  And he talked to his family    11/6/17- Mr. Judith Demarco reported feeling better, but very vague and unclear about circumstances leading to admission. SIDE EFFECTS: (reviewed/updated 11/6/2017)  None reported or admitted to. ALLERGIES:(reviewed/updated 11/6/2017)  Allergies   Allergen Reactions    Seafood Hives      MEDICATIONS PRIOR TO ADMISSION:(reviewed/updated 11/6/2017)  No prescriptions prior to admission. PAST MEDICAL HISTORY: Past medical history from the initial psychiatric evaluation has been reviewed (reviewed/updated 11/6/2017) with no additional updates (I asked patient and no additional past medical history provided). Past Medical History:   Diagnosis Date    Migraine    History reviewed. No pertinent surgical history.    SOCIAL HISTORY: Social history from the initial psychiatric evaluation has been reviewed (reviewed/updated 11/6/2017) with no additional updates (I asked patient and no additional social history provided). Social History     Social History    Marital status: SINGLE     Spouse name: N/A    Number of children: N/A    Years of education: N/A     Occupational History    Not on file. Social History Main Topics    Smoking status: Current Some Day Smoker    Smokeless tobacco: Never Used    Alcohol use No    Drug use: No    Sexual activity: Not on file     Other Topics Concern    Not on file     Social History Narrative      FAMILY HISTORY: Family history from the initial psychiatric evaluation has been reviewed (reviewed/updated 11/6/2017) with no additional updates (I asked patient and no additional family history provided). History reviewed. No pertinent family history. REVIEW OF SYSTEMS: (reviewed/updated 11/6/2017)  Appetite:improved   Sleep: improved   All other Review of Systems: History obtained from the patient         [facility] [place] [place]    MSE FINDINGS ARE WITHIN NORMAL LIMITS (WNL) UNLESS OTHERWISE STATED BELOW. ( ALL OF THE BELOW CATEGORIES OF THE MSE HAVE BEEN REVIEWED (reviewed 11/6/2017) AND UPDATED AS DEEMED APPROPRIATE )  General Presentation age appropriate, cooperative   Orientation oriented to time, place and person   Vital Signs  See below (reviewed 11/6/2017); Vital Signs (BP, Pulse, & Temp) are within normal limits if not listed below.    Gait and Station Stable/steady, no ataxia   Musculoskeletal System No extrapyramidal symptoms (EPS); no abnormal muscular movements or Tardive Dyskinesia (TD); muscle strength and tone are within normal limits   Language No aphasia or dysarthria   Speech:  normal pitch and normal volume   Thought Processes logical; slow rate of thoughts; poor abstract reasoning/computation   Thought Associations goal directed   Thought Content free of delusions   Suicidal Ideations no plan  and no intention Homicidal Ideations no plan  and no intention   Mood:  anxious    Affect:  anxious   Memory recent  impaired   Memory remote:  fair     Concentration/Attention:  fair   Fund of Knowledge average   Insight:  limited   Reliability poor   Judgment:  fair          VITALS:     Patient Vitals for the past 24 hrs:   Temp Pulse Resp BP SpO2   11/06/17 1601 98.5 °F (36.9 °C) 68 18 123/81 99 %   11/06/17 1235 98.1 °F (36.7 °C) 94 16 123/80 -   11/06/17 0730 98.1 °F (36.7 °C) 60 18 130/79 97 %   11/05/17 2015 98.5 °F (36.9 °C) 64 16 112/76 96 %     Wt Readings from Last 3 Encounters:   11/05/17 55.2 kg (121 lb 11.2 oz)   02/07/15 61.2 kg (135 lb)     Temp Readings from Last 3 Encounters:   11/06/17 98.5 °F (36.9 °C)   02/07/15 98.1 °F (36.7 °C)     BP Readings from Last 3 Encounters:   11/06/17 123/81   02/07/15 119/75     Pulse Readings from Last 3 Encounters:   11/06/17 68   02/07/15 72            DATA     LABORATORY DATA:(reviewed/updated 11/6/2017)  No results found for this or any previous visit (from the past 24 hour(s)). No results found for: VALF2, VALAC, VALP, VALPR, DS6, CRBAM, CRBAMP, CARB2, XCRBAM  No results found for: LITHM   RADIOLOGY REPORTS:(reviewed/updated 11/6/2017)  No results found.        MEDICATIONS     ALL MEDICATIONS:   Current Facility-Administered Medications   Medication Dose Route Frequency    ziprasidone (GEODON) 20 mg in sterile water (preservative free) 1 mL injection  20 mg IntraMUSCular BID PRN    benztropine (COGENTIN) tablet 2 mg  2 mg Oral BID PRN    benztropine (COGENTIN) injection 2 mg  2 mg IntraMUSCular BID PRN    LORazepam (ATIVAN) injection 2 mg  2 mg IntraMUSCular Q4H PRN    LORazepam (ATIVAN) tablet 1 mg  1 mg Oral Q4H PRN    zolpidem (AMBIEN) tablet 10 mg  10 mg Oral QHS PRN    acetaminophen (TYLENOL) tablet 650 mg  650 mg Oral Q4H PRN    ibuprofen (MOTRIN) tablet 400 mg  400 mg Oral Q8H PRN    magnesium hydroxide (MILK OF MAGNESIA) 400 mg/5 mL oral suspension 30 mL  30 mL Oral DAILY PRN    nicotine (NICODERM CQ) 21 mg/24 hr patch 1 Patch  1 Patch TransDERmal DAILY PRN    sertraline (ZOLOFT) tablet 25 mg  25 mg Oral DAILY    nicotine (NICORETTE) gum 2 mg  2 mg Oral Q2H PRN      SCHEDULED MEDICATIONS:   Current Facility-Administered Medications   Medication Dose Route Frequency    sertraline (ZOLOFT) tablet 25 mg  25 mg Oral DAILY          ASSESSMENT & PLAN     DIAGNOSES REQUIRING ACTIVE TREATMENT AND MONITORING: (reviewed/updated 11/6/2017)  Patient Active Hospital Problem List:   Depression with anxiety (11/4/2017)    Assessment: Anxiety , Tics , R/O dystonia     Plan: Continue Zoloft  And D/C Zyprexa   Need collateral information    Movement disorder  Assessment: head and neck movements chronic  Plan: consult neurology        I will continue to monitor blood levels (Depakote, Tegretol, lithium, clozapine---a drug with a narrow therapeutic index= NTI) and associated labs for drug therapy implemented that require intense monitoring for toxicity as deemed appropriate based on current medication side effects and pharmacodynamically determined drug 1/2 lives. In summary, Norval Najjar, is a 24 y.o.  male who presents with a severe exacerbation of the principal diagnosis of Depression with anxiety  Patient's condition is worsening/not improving/not stable improving. Patient requires continued inpatient hospitalization for further stabilization, safety monitoring and medication management. I will continue to coordinate the provision of individual, milieu, occupational, group, and substance abuse therapies to address target symptoms/diagnoses as deemed appropriate for the individual patient. A coordinated, multidisplinary treatment team round was conducted with the patient (this team consists of the nurse, psychiatric unit pharmcist,  and writer).      Complete current electronic health record for patient has been reviewed today including consultant notes, ancillary staff notes, nurses and psychiatric tech notes. Suicide risk assessment completed and patient deemed to be of low risk for suicide at this time. The following regarding medications was addressed during rounds with patient:   the risks and benefits of the proposed medication. The patient was given the opportunity to ask questions. Informed consent given to the use of the above medications. Will continue to adjust psychiatric and non-psychiatric medications (see above \"medication\" section and orders section for details) as deemed appropriate & based upon diagnoses and response to treatment. I will continue to order blood tests/labs and diagnostic tests as deemed appropriate and review results as they become available (see orders for details and above listed lab/test results). I will order psychiatric records from previous [facility] hospitals to further elucidate the nature of patient's psychopathology and review once available. I will gather additional collateral information from friends, family and o/p treatment team to further elucidate the nature of patient's psychopathology and baselline level of psychiatric functioning. I certify that this patient's inpatient psychiatric hospital services furnished since the previous certification were, and continue to be, required for treatment that could reasonably be expected to improve the patient's condition, or for diagnostic study, and that the patient continues to need, on a daily basis, active treatment furnished directly by or requiring the supervision of inpatient psychiatric facility personnel. In addition, the hospital records show that services furnished were intensive treatment services, admission or related services, or equivalent services.     EXPECTED DISCHARGE DATE/DAY: TBD     DISPOSITION: Home       Signed By:   Zane Kong MD  11/6/2017

## 2017-11-07 NOTE — BH NOTES
Pt attended reflections group. Discussed some unit rules and procedures, and coping skills. Pt attentive; pt seemed to understand.

## 2017-11-07 NOTE — PROGRESS NOTES
Problem: Falls - Risk of  Goal: *Absence of Falls  Document Drake Fall Risk and appropriate interventions in the flowsheet. Outcome: Progressing Towards Goal  Fall Risk Interventions:            Medication Interventions: Teach patient to arise slowly         2315 Pt appears sleep in bed. Respirations even and unlabored. Night light on, door remains open. Will continue to monitor with Q 15 safety checks.

## 2017-11-07 NOTE — BH NOTES
GROUP THERAPY PROGRESS NOTE    [facility] did not participate in a fifty minute morning Process Group on the General Unit with a focus identifying feelings, planning for the day, and learning more about DBT concepts on \"Emotion Regulation. \"

## 2017-11-07 NOTE — DISCHARGE INSTRUCTIONS
DISCHARGE SUMMARY    Aroldo Taveras  : 1990  MRN: [id number]    The patient Brigette Vyas exhibits the ability to control behavior in a less restrictive environment. Patient's level of functioning is improving. No assaultive/destructive behavior has been observed for the past 24 hours. No suicidal/homicidal threat or behavior has been observed for the past 24 hours. There is no evidence of serious medication side effects. Patient has not been in physical or protective restraints for at least the past 24 hours. If weapons involved, how are they secured? No weapons involved. Is patient aware of and in agreement with discharge plan? Yes    Arrangements for medication:  Prescriptions given to patient. Copy of discharge instructions to  provider?:  [facility] (Attn: Helder Boyce)    Arrangements for transportation home:  Family to . Keep all follow up appointments as scheduled, continue to take prescribed medications per physician instructions. Mental health crisis number:  423 or your local mental health crisis line number at [phone]. DISCHARGE SUMMARY from Nurse    PATIENT INSTRUCTIONS:    What to do at Home:  Recommended activity: Activity as tolerated,     If you experience any of the following symptoms thoughts of harming self, feeling overwhelmed with anxiety and sadness, please follow up with your assigned providers and local crisis number. *  Please give a list of your current medications to your Primary Care Provider. *  Please update this list whenever your medications are discontinued, doses are      changed, or new medications (including over-the-counter products) are added. *  Please carry medication information at all times in case of emergency situations.     These are general instructions for a healthy lifestyle:    No smoking/ No tobacco products/ Avoid exposure to second hand smoke  Surgeon General's Warning:  Quitting smoking now greatly reduces serious risk to your health. Obesity, smoking, and sedentary lifestyle greatly increases your risk for illness    A healthy diet, regular physical exercise & weight monitoring are important for maintaining a healthy lifestyle    You may be retaining fluid if you have a history of heart failure or if you experience any of the following symptoms:  Weight gain of 3 pounds or more overnight or 5 pounds in a week, increased swelling in our hands or feet or shortness of breath while lying flat in bed. Please call your doctor as soon as you notice any of these symptoms; do not wait until your next office visit. Recognize signs and symptoms of STROKE:    F-face looks uneven    A-arms unable to move or move unevenly    S-speech slurred or non-existent    T-time-call 911 as soon as signs and symptoms begin-DO NOT go       Back to bed or wait to see if you get better-TIME IS BRAIN. Warning Signs of HEART ATTACK     Call 911 if you have these symptoms:   Chest discomfort. Most heart attacks involve discomfort in the center of the chest that lasts more than a few minutes, or that goes away and comes back. It can feel like uncomfortable pressure, squeezing, fullness, or pain.  Discomfort in other areas of the upper body. Symptoms can include pain or discomfort in one or both arms, the back, neck, jaw, or stomach.  Shortness of breath with or without chest discomfort.  Other signs may include breaking out in a cold sweat, nausea, or lightheadedness. Don't wait more than five minutes to call 911 - MINUTES MATTER! Fast action can save your life. Calling 911 is almost always the fastest way to get lifesaving treatment. Emergency Medical Services staff can begin treatment when they arrive -- up to an hour sooner than if someone gets to the hospital by car. The discharge information has been reviewed with the patient. The patient verbalized understanding.   Discharge medications reviewed with the patient and appropriate educational materials and side effects teaching were provided.   ___________________________________________________________________________________________________________________________________

## 2017-11-07 NOTE — PROGRESS NOTES
Problem: Altered Thought Process (Adult/Pediatric)  Goal: *STG: Participates in treatment plan  Outcome: Progressing Towards Goal  Review meds, out on unit passively engaged in milieu. Using phone independently noted eating away from peers at meal time. Reports \"fine\" when asked about day and his mood. Affect appears congruent w reported mood. Does not appear to be internally stimulated. Does not report AH.  Pt daily goal is to contact father to confirm d/c plans for today and present this to tx team.   Goal: *STG: Complies with medication therapy  Outcome: Progressing Towards Goal  compliant  Goal: *STG: Attends activities and groups  Outcome: Progressing Towards Goal  engaged  Goal: *STG: Decreased hallucinations  Outcome: Progressing Towards Goal  denies  Goal: Interventions  Outcome: Progressing Towards Goal  Staff focus is on coping skills education, family meeting for this afternoon and offer support and reassurance

## 2017-11-07 NOTE — CONSULTS
[address] [facility] [address], [address] [facility]   [address] [facility]       Name:  Yajaira Salcido   MR#:  [id number]   :  1990   Account #:  [de-identified]    Date of Consultation:  2017   Date of Adm:  2017       HISTORY OF PRESENT ILLNESS: This is a 29-year-old right-handed   gentleman who was admitted on the  for depression   with suicidal ideation. The patient was noted to have head twitching. It   was described in the ED as a bobbing whenever he speaks, but not   seen when he is not being engaged. The patient is unable to tell me   when it started, or if he has had any other kind of movements in the   past or vocalizations in the past. According to the ED note, his uncle   was with him when he first presented, and it was reported as head   twitching began on the day of presentation. H and P also notes that he   had not been sleeping well for 4 or 5 days. The patient tells me that he   only takes pain medicines at home, I believe over-the-counter pain   medications given to him by his family. He tells me that the medication   they are giving him here is making everything better, but on   clarification, not the head bobbing. He is receiving Zoloft and he feels   that this has made him feel better. The patient is a limited historian, I   am not certain if this is a reflection of education or if he has cognitive   impairment at baseline. The patient tells me he is not really bothered   by the head bobbing or head turning. When asked, he can control the   movement and make it stop for a short time and then he states he   feels like he needs to move his head to the right. PAST MEDICAL HISTORY: Migraine headaches, depression, anxiety. REVIEW OF SYSTEMS   Per past medical history and HPI, otherwise reviewed with the patient   and negative, but again limited. MEDICATIONS AT HOME: None at home except \"pain meds,\" no   medications for nausea. Here he is on     1. Zoloft. 2. Nicoderm. ALLERGIES: NONE. SOCIAL HISTORY: He lives in [place] with his grandmother, his   uncle give him jobs as a . He completed the tenth grade, but   wants to go back and finish high school. No alcohol or drug use. He   does smoke cigarettes. FAMILY HISTORY: Two brothers and sisters. He is unaware of any of   their health issues. he does not have any children. PHYSICAL EXAMINATION   VITAL SIGNS: Blood pressure 126/85, pulse 64, respiratory rate 16,   saturating 100% on room air, temperature is 98.3. GENERAL: He is a well-nourished, well-developed, healthy-appearing   young male in no distress, sitting on the side of the bed. HEART: Has a regular rate and rhythm without murmurs. His carotids   are 2+, no bruits. EXTREMITIES: Warm. No edema, 2+ radial pulses. NEUROLOGIC   MENTAL STATUS: He is alert. He is oriented x4. Speech and   language are intact. CRANIAL NERVES: No asymmetry or ptosis. His extraocular eye   movements intact without diplopia or nystagmus. Visual fields are full. Pupils are equally round and reactive. Tongue is midline. Palate   elevated symmetrically. Hearing is intact. MOTOR EXAMINATION: He has 5/5 strength throughout. No tremors   seen. No pronator drift. He does have intermittent head turning to the   right with quick turn back to center. He may have also had quick   movements in his upper extremities. Again, the patient is able to   suppress this movement when concentrating on it. SENSORY EXAM: Was intact to light touch and pinprick throughout. Reflexes were 2+ and symmetric. His toes are downgoing. His   coordination was intact to finger-to-nose, heel-to-shin, rapid alternating   movements. GAIT: He has a steady routine gait and was able to tandem walk   without difficulty. STUDIES AND REPORTS: CBC unremarkable. CMP with a glucose of   124.  Urine drug screen, UA, salicylate, acetaminophen and alcohol   levels were all negative or normal.     ASSESSMENT AND PLAN: This is a 22-year-old right-handed male   admitted 11/03/2017 with suicidal ideation and what was described as   head bobbing or twitching who continues to have quick head   movement to the right that the patient can suppress if he focuses, but   feels the need to move again without any other deficits on his exam.   Specifically, no dysmetria, no difficulty with tandem walking. I suspect   this is a tic. It is not clear if he has had a past history of tics or   vocalizations to suggest Tourette syndrome. Currently, the patient is   not bothered by his head movements, so would not recommend   treating them. He is welcome to follow up with me in clinic, preferably   with a family member to aid in the history if he would like treatment. The office number is [phone].         MD Brandy Klein / Chato Rucker   D:  11/07/2017   10:36   T:  11/07/2017   11:13   Job #:  [id number]

## 2017-11-07 NOTE — INTERDISCIPLINARY ROUNDS
Behavioral Health Interdisciplinary Rounds     Patient Name: Dara Guzman  Age: 24 y.o. Room/Bed:  731/  Primary Diagnosis: Depression with anxiety   Admission Status: Voluntary     Readmission within 30 days: no  Power of  in place: no  Patient requires a blocked bed: no          Reason for blocked bed: n/a    VTE Prophylaxis: Not indicated  Flu vaccine given : no - pt declined   Mobility needs/Fall risk: no    Nutritional Plan: no  Consults: neuro          Labs/Testing due today?: yes - TSH: refused     Sleep hours: 5       Participation in Care/Groups:  yes  Medication Compliant?: Yes  PRNS (last 24 hours):  Antianxiety    Restraints (last 24 hours):  no  Substance Abuse:  no  CIWA (range last 24 hours):  COWS (range last 24 hours):   Alcohol screening (AUDIT) completed -  AUDIT Score: 0  If applicable, date SBIRT discussed in treatment team AND documented: N/A  Tobacco - patient is a smoker: yes   Date tobacco education completed by RN: 11/6/17  24 hour chart check complete: yes     Patient goal(s) for today:   Treatment team focus/goals: Call parents/uncle  Progress note     LOS:  3  Expected LOS:     Financial concerns/prescription coverage: Uninsured  Date of last family contact: 11/7 SW spoke to father      Family requesting physician contact today: No  Discharge plan: Return home to grandmother when stable for discharge   Guns in the home: unknown        Outpatient provider(s): Divya Tian    Participating treatment team members: Dara Guzman, MARI Smith; Dr. Alfredo Guzman MD; Miley Dowd RN; Margaret White, PharmD

## 2017-11-07 NOTE — BH NOTES
Pt discharge with his family. No distress noted. Denies SI/HI at this time. All belongings returned to pt. Discharge instructions reviewed and signed with pt.

## 2017-11-07 NOTE — CONSULTS
Neuro consult completed. Dictated note to follow. Probable tic, pt is not bothered by it, would not start any medication. I am happy to see him in f/u in clinic, preferably with family member present to aid in history, if he does decide he would like treatment. Office number is [phone].